# Patient Record
Sex: MALE | Race: WHITE | NOT HISPANIC OR LATINO | URBAN - METROPOLITAN AREA
[De-identification: names, ages, dates, MRNs, and addresses within clinical notes are randomized per-mention and may not be internally consistent; named-entity substitution may affect disease eponyms.]

---

## 2024-02-08 NOTE — COMMUNITY RESOURCES LIST (ENGLISH)
02/08/2024   [facility]  N/A  For questions about this resource list or additional care needs, please contact your primary care clinic or care manager.  Phone: [phone]   Email: N/A   Address: [address] [place] [id number]   Hours: N/A        Food and Nutrition       Food pantry  1  [facility] - Food [place] - Food Shelf Distance: 0.35 miles      Pick   [address] [place] [address]  Language: English, English  Hours: Wed 10:00 AM - 1:00 PM  Fees: Free   Phone: (542) 662-6927 Website: http://www.[facility]light.Quantified Skin/     2  [facility] Distance: 0.45 miles      In-Person   [address] [place] [address]  Language: English  Hours: Mon - Wed 9:30 AM - 12:00 PM  Fees: Free   Phone: (423) 208-7645 Email: info@saintolaf.org Website: http://www.saintolaf.org/     SNAP application assistance  3  [facility] Human Services and Public Health Department - Chemical Dependency Services (CDS) Distance: 0.31 miles      In-Person, Phone/Virtual   [address] [place] [address]  Language: English  Hours: Mon - Fri 9:00 AM - 9:00 PM  Fees: Free   Phone: (522) 481-9885 Website: https://[facility].[place]./[facility]/human-services/treatment-substance-use-disorders     4  [facility] - Satish [place] Distance: 0.77 miles      Phone/Virtual   [address], [place] [address]  Language: English, Salvadorean  Hours: Mon - Fri 10:00 AM - 5:00 PM  Fees: Free   Phone: (403) 876-2807 Email: arnold@[facility].org Website: https://www.[facility].org/satish-house     Soup kitchen or free meals  5  [facility] and [place] [facility] Distance: 0.24 miles      In-Person   [address] [place] [address]  Language: English, English  Hours: Mon - Sat 8:00 AM - 9:00 AM , Mon - Sat 11:30 AM - 12:30 PM  Fees: Free   Phone: (173) 852-8735 Email: info@Skyhigh Networksties.org Website:  https://www.[facility].org/locations/opportunity-center/     6  [facility] - Food [place] Public Meals Distance: 0.34 miles      [facility]   [address] [place] [address]  Language: English  Hours: Mon - Sun 8:30 AM - 9:00 AM , Mon - Sun 12:00 PM - 1:00 PM  Fees: Free   Phone: (597) 551-6698 Email: info@Yoomly Website: https://Yoomly/          Important Numbers & Websites       Emergency Services   911  [facility]   311  Poison Control   (555) 712-1682  Suicide Prevention Lifeline   (901) 978-1426 (TALK)  Child Abuse Hotline   (501) 303-3371 (4-A-Child)  Sexual Assault Hotline   (702) 547-2207 (HOPE)  National Runaway Safeline   (678) 251-7406 (RUNAWAY)  All-Options Talkline   (561) 112-9572  Substance Abuse Referral   (621) 584-6920 (HELP)

## 2024-02-15 ENCOUNTER — OFFICE VISIT (OUTPATIENT)
Dept: FAMILY MEDICINE | Facility: CLINIC | Age: 24
End: 2024-02-15
Payer: COMMERCIAL

## 2024-02-15 VITALS
TEMPERATURE: 98.3 F | WEIGHT: 206.4 LBS | OXYGEN SATURATION: 98 % | BODY MASS INDEX: 31.28 KG/M2 | HEART RATE: 81 BPM | HEIGHT: 68 IN | DIASTOLIC BLOOD PRESSURE: 86 MMHG | SYSTOLIC BLOOD PRESSURE: 135 MMHG

## 2024-02-15 DIAGNOSIS — Z11.3 ROUTINE SCREENING FOR STI (SEXUALLY TRANSMITTED INFECTION): ICD-10-CM

## 2024-02-15 DIAGNOSIS — Z77.21 CONTACT WITH AND (SUSPECTED) EXPOSURE TO POTENTIALLY HAZARDOUS BODY FLUIDS: ICD-10-CM

## 2024-02-15 DIAGNOSIS — R45.89 SYMPTOMS OF DEPRESSION: ICD-10-CM

## 2024-02-15 DIAGNOSIS — Z00.00 ENCOUNTER FOR MEDICAL EXAMINATION TO ESTABLISH CARE: Primary | ICD-10-CM

## 2024-02-15 PROBLEM — F84.0 AUTISM: Status: ACTIVE | Noted: 2024-02-15

## 2024-02-15 LAB
CREAT SERPL-MCNC: 0.88 MG/DL (ref 0.51–1.17)
EGFRCR SERPLBLD CKD-EPI 2021: >90 ML/MIN/1.73M2
HCV AB SERPL QL IA: NONREACTIVE
HIV 1+2 AB+HIV1 P24 AG SERPL QL IA: NONREACTIVE
T PALLIDUM AB SER QL: NONREACTIVE

## 2024-02-15 PROCEDURE — 36415 COLL VENOUS BLD VENIPUNCTURE: CPT

## 2024-02-15 PROCEDURE — 99214 OFFICE O/P EST MOD 30 MIN: CPT | Mod: 25

## 2024-02-15 PROCEDURE — 87389 HIV-1 AG W/HIV-1&-2 AB AG IA: CPT

## 2024-02-15 PROCEDURE — 90746 HEPB VACCINE 3 DOSE ADULT IM: CPT

## 2024-02-15 PROCEDURE — 87491 CHLMYD TRACH DNA AMP PROBE: CPT

## 2024-02-15 PROCEDURE — 86780 TREPONEMA PALLIDUM: CPT

## 2024-02-15 PROCEDURE — 87591 N.GONORRHOEAE DNA AMP PROB: CPT

## 2024-02-15 PROCEDURE — 82565 ASSAY OF CREATININE: CPT

## 2024-02-15 PROCEDURE — 86803 HEPATITIS C AB TEST: CPT

## 2024-02-15 PROCEDURE — 90471 IMMUNIZATION ADMIN: CPT

## 2024-02-15 RX ORDER — EMTRICITABINE AND TENOFOVIR DISOPROXIL FUMARATE 200; 300 MG/1; MG/1
1 TABLET, FILM COATED ORAL DAILY
COMMUNITY
Start: 2024-02-15 | End: 2024-02-16

## 2024-02-15 RX ORDER — EMTRICITABINE AND TENOFOVIR DISOPROXIL FUMARATE 200; 300 MG/1; MG/1
TABLET, FILM COATED ORAL
COMMUNITY
End: 2024-02-15 | Stop reason: ALTCHOICE

## 2024-02-15 RX ORDER — EMTRICITABINE AND TENOFOVIR DISOPROXIL FUMARATE 200; 300 MG/1; MG/1
1 TABLET, FILM COATED ORAL DAILY
Qty: 90 TABLET | Refills: 0 | Status: SHIPPED | OUTPATIENT
Start: 2024-02-15 | End: 2024-02-15

## 2024-02-15 RX ORDER — OMEGA-3 FATTY ACIDS/FISH OIL 300-1000MG
CAPSULE ORAL
COMMUNITY

## 2024-02-15 ASSESSMENT — SOCIAL DETERMINANTS OF HEALTH (SDOH)
WITHIN THE LAST YEAR, HAVE TO BEEN RAPED OR FORCED TO HAVE ANY KIND OF SEXUAL ACTIVITY BY YOUR PARTNER OR EX-PARTNER?: NO
WITHIN THE LAST YEAR, HAVE YOU BEEN HUMILIATED OR EMOTIONALLY ABUSED IN OTHER WAYS BY YOUR PARTNER OR EX-PARTNER?: NO
WITHIN THE LAST YEAR, HAVE YOU BEEN KICKED, HIT, SLAPPED, OR OTHERWISE PHYSICALLY HURT BY YOUR PARTNER OR EX-PARTNER?: NO
WITHIN THE LAST YEAR, HAVE YOU BEEN AFRAID OF YOUR PARTNER OR EX-PARTNER?: NO

## 2024-02-15 NOTE — PROGRESS NOTES
Preceptor Attestation:  Patient seen and evaluated in person. I discussed the patient with the resident. I have verified the content of the note, which accurately reflects my assessment of the patient and the plan of care.   Supervising Physician:  Sidra Galdamez DO
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
## 2024-02-15 NOTE — PROGRESS NOTES
Assessment & Plan     Constantino is a 23 year old patient who presents for the following concerns:      Encounter for medical examination to establish care  Updated history    Contact with and (suspected) exposure to potentially hazardous body fluids  Transferring care here, refilled, provided PrEP agreement and will obtain labs as not in system  - emtricitabine-tenofovir (TRUVADA) 200-300 MG per tablet  - HIV Antigen Antibody Combo Cascade  - Creatinine  - Next HIV due: 5/15/24  - Next Cr due: 8/15/24    Routine screening for STI (sexually transmitted infection)  - Hepatitis C Screen Reflex to HCV RNA Quant and Genotype  - HIV Antigen Antibody Combo Cascade  - Treponema Abs w Reflex to RPR and Titer  - Chlamydia trachomatis/Neisseria gonorrhoeae by PCR  - Chlamydia trachomatis/Neisseria gonorrhoeae by PCR  - Chlamydia trachomatis/Neisseria gonorrhoeae by PCR    Symptoms of depression  Will provide queer friendly therapist list in avs      Orders Placed This Encounter   Procedures    HEPATITIS B, ADULT 20+ (ENGERIX-B/RECOMBIVAX HB)    Hepatitis C Screen Reflex to HCV RNA Quant and Genotype    HIV Antigen Antibody Combo Cascade    Treponema Abs w Reflex to RPR and Titer    Creatinine       Post Medication Reconciliation Status: medications reconciled and changed, per note/orders    Return in about 3 months (around 5/15/2024) for Follow up, with any available provider.    More Lorenzo MD  [facility] RAFAEL    Subjective             This is a 23 year old patient, presenting for the following health concerns:    Consult (Establishing care + truvada rx)          HIV Prevention / PrEP - Follow up  Constantino is here for follow up concerning pre-exposure prophylaxis for HIV.    Ongoing risk factors for acquiring HIV infection:  Patient is member of high prevalence group (MSM, non-monogmous partnership): no/yes: no   Any anal sex without condoms in the past 6 months: no/yes: YES  Has a current partner that is HIV  "positive: no/yes: no   ANY Bacterial STI in the last 6 months: no/yes: no     Last HIV test: pos/neg: negative Date: 1 month ago, patient reported      Review of Systems   DENIES: Denies lymphadenopathy,fevers,chills,rigors,night sweats,weight loss,oral thrush,mouth lesions,dyspnea,cough,diarrhea, edema.  DENIES: Denies  discharge, rash, genital lesions.         Review of Systems   Constitutional, HEENT, cardiovascular, pulmonary, gi and gu systems are negative, except as otherwise noted.      Objective    /86   Pulse 81   Temp 98.3  F (36.8  C) (Oral)   Ht 1.727 m (5' 8\")   Wt 93.6 kg (206 lb 6.4 oz)   SpO2 98%   BMI 31.38 kg/m    Wt Readings from Last 4 Encounters:   02/15/24 93.6 kg (206 lb 6.4 oz)       Physical Exam    General: Alert and oriented, in no acute distress.  CV: No cyanosis or pallor, warm and well perfused. RRR  Respiratory: No respiratory distress, no accessory muscle use. LCTAB  Psychiatric: Mood and affect appear normal.       Results are ordered and pending    More Lorenzo MD on 2/15/2024 at 8:00 AM    ----- Service Performed and Documented by Resident or Fellow ------            .  "

## 2024-02-15 NOTE — PATIENT INSTRUCTIONS
"[place]        [facility]     Colt Sarmiento and others  [email or web address].[email or web address] Networksmn.Mascoma/team-member/ronnell/ [phone]  email: [email or web address] Networksmn.[email or web address]     [facility]   Providers: Magda Samaniego MSW, LICSW (6+) [phone]   https://www.[facility].org/    Bri Durham PsyD, [facility]    highly qualified and experienced therapist markus yrs in the field, works with youth and families, does consults for [employer]   [address] [place] [address], [address] [facility] [phone]    Family Partnership   all ages   [place]   \"many providers, see link [phone], Hmoob: [phone]  https://www.[facility].org/ \"     [facility]  [place]   [email or web address], [phone]     [facility]    Laura Todd MSW, LICSW https://[facility]Demon/    [phone]    Adeola Santizo    [place]    [email or web address]/ couples therapy too     [facility]   [place]  elie Francis they/them, others  http://www.[facility]/therapists.html   Devaughn Kimbrough MA, LMFT (16+), Geeta AVINA (10+); Jackelyn Calderón MS LAMFT; Margot Chaudhary MS; Taylor Rachel MS, LAMFT; & Others    [facility]   [place]   Carlos Zelaya Risa Roth and Nacho Quintanilla all see adolescents and adults https://[facility].com/skzd-qvmrtfgiz-uhzz/     New quejavid/trans affirming group for folks newly exploring polyamory - call [phone] ext [phone]    [facility]     [facility] [address] [id number]   [email or web address]/services     Melanie Whittaker PhD     [place]    [facility], [facility]     Rafita Barrera they/them   private practice, transfeminine support group.     Odilon Yeboah she/her  private practice  [address]   [phone] " "  arttherapymn.[employer]/phi/                [facility]   [phone] https://Ampex/services/childrens-mental-health-services/    Jaimee Temple [facility], Nikky Herrera MS, LPC, CEOLD; Nilo Johnson MS, LPCC(older adolescents); & others    [facility]   ages 12-26    sliding scale, have weekly parent education groups    [facility].care, [phone]    Barbara Hoyos    gender therapy and family therapy, good support for partner     [facility]    carina, saji tang, [place]   Anshu Gomes, also does testing for ADHD, other psychiatric issues,   Cali Benton. They also have a prgoram for DBT   https://[email or web address].com/counseling-for-lgbtqia-concerns/     Susan Hameed MA; Roopa Moran MS, [facility], others   Amber Helms  private prac http://www.[facility] [place]/ [email or web address]@[email or web address].net     Scar Thurston  he/him   [address] [facility] [phone]   [email or web address]/ [email or web address]    Delia Yeager   Private practice, does TeleHealth, experience with trans vets, but also civilian trans folks.   [email or web address]    Mandi Louie she/they  gender creative children, adolescents, adults, [facility], housed within the dept of Urology      Franklin Johansen he/him they/them  [facility], housed in urology        [facility]  [phone], [email or web address]     The family development center  https://www.[facility].com/our-therapists     PrEP Agreement    It has been explained to me that:     Taking a dose of PrEP medication every day may lower my risk of getting HIV infection.  If I am unable to take daily medications or this is not the best option for me, we have discussed \"PrEP On Demand Dosing\" (AKA PrEP 2-1-1)  This medicine does not completely eliminate my risk of getting HIV infection, so I should use condoms during sex for additional protection.   This medicine may cause side " "effects so I should contact my provider for advice by calling [phone] if I have any health problems.    It is important for my health to find out quickly if I get HIV infection while I'm taking this medication, so I will contact my provider right away if I have symptoms of possible HIV infection (symptoms like fever with sore throat, rash, headache, or swollen glands).  My provider will test for HIV infection at least once every 3 months.   PrEP cannot be refilled without routine testing.      Therefore, I will:     Try my best to take the medication as prescribed/ discussed with my provider  Talk to my provider about any problems I have in taking the medication  Not share the medication with any other person  Attend all my scheduled appointments/ complete all routine labs  Call [phone] to reschedule any appointments I cannot attend.       What Is OMT (Osteopathic Manipulative Treatment)?    As part of their education, DOs (doctor of osteopathic medicine) receive special training in the musculoskeletal system (your nerves, bones and muscles).     OMT involves using the hands to diagnose, treat and prevent illness or injury.  Using OMT, your osteopathic physician will move your muscles and joints using techniques including stretching, gentle pressure and resistance.     OMT can help people of all ages and backgrounds. In addition to muscle or joint pain, it can be used to treat a variety of conditions such as asthma, sinus pain, migraines and carpal tunnel syndrome.     For more information, please visit [email or web address].org    How to Schedule OMT :  Please make an appointment for \"OMT\" with the .  Please inform them you are scheduling for OMT with any DO provider. A list is provided below:     Melly Muñoz, DO  Jefferson Garcia, DO  America Hodges, DO  Sidra Galdamez, DO  Nargis Montano, DO  Ian Rubio, DO  Bong Patel, DO  Rylee Connors, DO  Bharati Feliz, DO    Patient Education "   Here is the plan from today's visit    1. Encounter for medical examination to establish care    2. Contact with and (suspected) exposure to potentially hazardous body fluids  - Creatinine; Future  - emtricitabine-tenofovir (TRUVADA) 200-300 MG per tablet; Take 1 tablet by mouth daily    3. Routine screening for STI (sexually transmitted infection)  - Hepatitis C Screen Reflex to HCV RNA Quant and Genotype; Future  - HIV Antigen Antibody Combo Cascade; Future  - Treponema Abs w Reflex to RPR and Titer; Future  - Chlamydia trachomatis/Neisseria gonorrhoeae by PCR  - Chlamydia trachomatis/Neisseria gonorrhoeae by PCR  - Chlamydia trachomatis/Neisseria gonorrhoeae by PCR; Future  - Hepatitis C Screen Reflex to HCV RNA Quant and Genotype  - HIV Antigen Antibody Combo Cascade  - Treponema Abs w Reflex to RPR and Titer  - Chlamydia trachomatis/Neisseria gonorrhoeae by PCR    4. Symptoms of depression        Please call or return to clinic if your symptoms don't go away.    Follow up plan  Return in about 3 months (around 5/15/2024) for Follow up, with any available provider.    Thank you for coming to [facility] today.  Lab Testing:  **If you had lab testing today and your results are reassuring or normal they will be mailed to you or sent through GoYoDeo within 7 days.   **If the lab tests need quick action we will call you with the results.  **If you are having labs done on a different day, please call [phone] to schedule at [facility] or [phone] for other [facility] locations. Labs do not offer walk-in appointments.  The phone number we will call with results is # [phone] (home) . If this is not the best number please call our clinic and change the number.  Medication Refills:  If you need any refills please call your pharmacy and they will contact us.   If you need to  your refill at a new pharmacy, please contact the new pharmacy directly. The new pharmacy will help  you get your medications transferred faster.   Scheduling:  If you have any concerns about today's visit or wish to schedule another appointment please call our office during normal business hours [phone] (8-5:00 M-F). If you can no longer make a scheduled visit, please cancel via [employer] or call us to cancel.   If a referral was made to an [facility] specialty provider and you do not get a call from central scheduling, please refer to directions on your visit summary or call our office during normal business hours for assistance.   If a Mammogram was ordered for you at the Breast Center call [phone] to schedule or change your appointment.  If you had an XRay/CT/Ultrasound/MRI ordered the number is [phone] to schedule or change your radiology appointment.   [facility] has limited ultrasound appointments available on Wednesdays, if you would like your ultrasound at [facility], please call [phone] to schedule.   Medical Concerns:  If you have urgent medical concerns please call [phone] at any time of the day.    More Lorenzo MD

## 2024-02-16 ENCOUNTER — TELEPHONE (OUTPATIENT)
Dept: FAMILY MEDICINE | Facility: CLINIC | Age: 24
End: 2024-02-16
Payer: COMMERCIAL

## 2024-02-16 LAB
C TRACH DNA SPEC QL PROBE+SIG AMP: NEGATIVE
N GONORRHOEA DNA SPEC QL NAA+PROBE: NEGATIVE

## 2024-02-16 RX ORDER — EMTRICITABINE AND TENOFOVIR DISOPROXIL FUMARATE 200; 300 MG/1; MG/1
1 TABLET, FILM COATED ORAL DAILY
Qty: 90 TABLET | Refills: 0 | Status: SHIPPED | OUTPATIENT
Start: 2024-02-16 | End: 2024-05-10

## 2024-02-16 NOTE — TELEPHONE ENCOUNTER
Spoke with preceptor, Dr. Muñoz, who thought that provider accidentally put the script in as historical, she changed it and I notified the pharmacy that script was ready to go.    Evelina Foster RN

## 2024-02-20 ENCOUNTER — OFFICE VISIT (OUTPATIENT)
Dept: FAMILY MEDICINE | Facility: CLINIC | Age: 24
End: 2024-02-20
Payer: COMMERCIAL

## 2024-02-20 VITALS
DIASTOLIC BLOOD PRESSURE: 97 MMHG | HEIGHT: 68 IN | OXYGEN SATURATION: 98 % | RESPIRATION RATE: 16 BRPM | HEART RATE: 78 BPM | SYSTOLIC BLOOD PRESSURE: 151 MMHG | WEIGHT: 206.5 LBS | BODY MASS INDEX: 31.3 KG/M2 | TEMPERATURE: 97.5 F

## 2024-02-20 DIAGNOSIS — M99.00 SEGMENTAL AND SOMATIC DYSFUNCTION OF HEAD REGION: ICD-10-CM

## 2024-02-20 DIAGNOSIS — M62.838 MUSCLE SPASM: Primary | ICD-10-CM

## 2024-02-20 DIAGNOSIS — R03.0 ELEVATED BLOOD PRESSURE READING WITHOUT DIAGNOSIS OF HYPERTENSION: ICD-10-CM

## 2024-02-20 DIAGNOSIS — M99.01 SEGMENTAL AND SOMATIC DYSFUNCTION OF CERVICAL REGION: ICD-10-CM

## 2024-02-20 PROCEDURE — 99213 OFFICE O/P EST LOW 20 MIN: CPT | Mod: 25

## 2024-02-20 PROCEDURE — 98925 OSTEOPATH MANJ 1-2 REGIONS: CPT | Mod: GC

## 2024-02-20 RX ORDER — CYCLOBENZAPRINE HCL 5 MG
5 TABLET ORAL 3 TIMES DAILY PRN
Qty: 30 TABLET | Refills: 0 | Status: SHIPPED | OUTPATIENT
Start: 2024-02-20 | End: 2024-03-13

## 2024-02-20 NOTE — PROGRESS NOTES
Preceptor Attestation:   Patient seen, evaluated and discussed with the resident. I have verified the content of the note, which accurately reflects my assessment of the patient and the plan of care.   Supervising Physician:  Avinash Pugh MD

## 2024-02-20 NOTE — PROGRESS NOTES
"HPI      Constantino is a 23 year old who presents today for Osteopathic Evaluation.   Chief Complaint   Patient presents with    Clinic Care Coordination - Initial     Pt here for omt       - Jaw pain worse over the past year, R>L that radiates down their neck  - Pretty sure that it started when they were 10 years old   - Wearing  to help with grinding   - Takes 600-800 mg ibuprofen every night    All active medical problems, med list, PMHx, and social Hx updated and reviewed.    No Known Allergies  Review of Systems       ROS      10 point ROS neg other than the symptoms noted above here or in the HPI.    Physical Exam     Vitals:    02/20/24 0925 02/20/24 0927   BP: (!) 149/84 (!) 151/97   BP Location: Left arm Left arm   Patient Position: Sitting Sitting   Cuff Size: Adult Large Adult Large   Pulse: 78    Resp: 16    Temp: 97.5  F (36.4  C)    TempSrc: Oral    SpO2: 98%    Weight: 93.7 kg (206 lb 8 oz)    Height: 1.727 m (5' 8\")      Vitals were reviewed    X-Ray: not indicated.  No results found for any visits on 02/20/24.    Physical Exam  General: Alert and oriented, in no acute distress.  Skin: Warm and dry, no abnormalities noted.  Eyes: Extra-ocular muscles intact, pupils equal and reactive.  ENT: Speech intact, nasal passages open, no hearing impairment noted.  Neck: Supple, no swelling noted.  CV: No cyanosis or pallor, warm and well perfused.  Respiratory: No respiratory distress, no accessory muscle use.  Neuro: Gait and station normal, comprehension intact. Gross and fine motor skills intact.   Psychiatric: Mood and affect appear normal.   Extremities: Warm, able to move all four extremities at will.    Osteopathic Structural Exam and additional MSK exam found below in OMT Procedure Note.  Assessment and Plan     (M62.838) Muscle spasm  (primary encounter diagnosis)  (M99.00) Segmental and somatic dysfunction of head region  (M99.01) Segmental and somatic dysfunction of cervical region  Comment: " Given that this has been interfering with the patient's quality of life and ADLs, after discussion, informed consent, and medical assessment for safety, we have together decided to address this concern with Osteopathic Manipulative Treatment. Please see OMT Procedure Note below for the specifics of treatment. Prescribed Flexeril to assist with TMJ release.  Plan: cyclobenzaprine (FLEXERIL) 5 MG tablet,         OSTEOPATHIC MANIP,1-2 BODY REGN          (R03.0) Elevated blood pressure reading without diagnosis of hypertension  Comment: Elevated blood pressures are likely secondary to white coat HTN, but Constantino reports that they have a history of elevated blood pressures in childhood. Ordered a BP cuff and instructed them to measure their BP at home and follow up with their PCP for management.  Plan: Home Blood Pressure Monitor Order for DME -         ONLY FOR DME    Rylee Connors DO, MPH     OMT PROCEDURE NOTE    Body Region: Head, Face, and/or Jaw  Somatic Dysfunction: Tender point over right TMJ, hypertonic bilateral suboccipital fossa, measurement of oral height is 60 cm with jaw fully open  Treatment: Direct Myofascial Release, Counterstrain, and Soft Tissue Techniques.  Outcome: Resolved    Body Region: C-spine / Neck  Somatic Dysfunction: Bilateral hypertonic paracervical muscles  Treatment: Direct Muscle Energy and Soft Tissue Techniques.  Outcome: Improved    The patient actively participated in OMT and was able to communicate both positive and negative feedback throughout. OMT completed without incident. Patient tolerated treatment well. Patient reported that ROM, function, and/or pain level were improved. Advised that pain is occasionally worse during the first 24 hours after treatment and that drinking more water and taking Tylenol or Ibuprofen often help. Patient to return in 2 week/s or as needed for repeat osteopathic assessment.     Rylee Connors DO, MPH

## 2024-02-28 ENCOUNTER — OFFICE VISIT (OUTPATIENT)
Dept: FAMILY MEDICINE | Facility: CLINIC | Age: 24
End: 2024-02-28
Payer: COMMERCIAL

## 2024-02-28 VITALS
SYSTOLIC BLOOD PRESSURE: 145 MMHG | HEIGHT: 68 IN | BODY MASS INDEX: 31.4 KG/M2 | HEART RATE: 139 BPM | DIASTOLIC BLOOD PRESSURE: 83 MMHG | TEMPERATURE: 98.3 F | OXYGEN SATURATION: 98 %

## 2024-02-28 DIAGNOSIS — Z13.220 SCREENING CHOLESTEROL LEVEL: ICD-10-CM

## 2024-02-28 DIAGNOSIS — Z13.1 SCREENING FOR DIABETES MELLITUS: ICD-10-CM

## 2024-02-28 DIAGNOSIS — I10 HYPERTENSION, UNSPECIFIED TYPE: Primary | ICD-10-CM

## 2024-02-28 LAB
ANION GAP SERPL CALCULATED.3IONS-SCNC: 11 MMOL/L (ref 7–15)
BUN SERPL-MCNC: 11.8 MG/DL (ref 6–20)
CALCIUM SERPL-MCNC: 10.3 MG/DL (ref 8.6–10)
CHLORIDE SERPL-SCNC: 101 MMOL/L (ref 98–107)
CHOLEST SERPL-MCNC: 160 MG/DL
CREAT SERPL-MCNC: 0.95 MG/DL (ref 0.51–1.17)
CREAT UR-MCNC: 83.4 MG/DL
DEPRECATED HCO3 PLAS-SCNC: 27 MMOL/L (ref 22–29)
EGFRCR SERPLBLD CKD-EPI 2021: >90 ML/MIN/1.73M2
FASTING STATUS PATIENT QL REPORTED: ABNORMAL
GLUCOSE SERPL-MCNC: 100 MG/DL (ref 70–99)
HBA1C MFR BLD: 5 % (ref 0–5.6)
HDLC SERPL-MCNC: 37 MG/DL
LDLC SERPL CALC-MCNC: 106 MG/DL
MICROALBUMIN UR-MCNC: <12 MG/L
MICROALBUMIN/CREAT UR: NORMAL MG/G{CREAT}
NONHDLC SERPL-MCNC: 123 MG/DL
POTASSIUM SERPL-SCNC: 4.6 MMOL/L (ref 3.4–5.3)
SODIUM SERPL-SCNC: 139 MMOL/L (ref 135–145)
TRIGL SERPL-MCNC: 83 MG/DL
TSH SERPL DL<=0.005 MIU/L-ACNC: 1.1 UIU/ML (ref 0.3–4.2)

## 2024-02-28 PROCEDURE — 82570 ASSAY OF URINE CREATININE: CPT

## 2024-02-28 PROCEDURE — 84443 ASSAY THYROID STIM HORMONE: CPT

## 2024-02-28 PROCEDURE — 82088 ASSAY OF ALDOSTERONE: CPT

## 2024-02-28 PROCEDURE — 82043 UR ALBUMIN QUANTITATIVE: CPT

## 2024-02-28 PROCEDURE — 99000 SPECIMEN HANDLING OFFICE-LAB: CPT

## 2024-02-28 PROCEDURE — 80061 LIPID PANEL: CPT

## 2024-02-28 PROCEDURE — 84244 ASSAY OF RENIN: CPT | Mod: 90

## 2024-02-28 PROCEDURE — 36415 COLL VENOUS BLD VENIPUNCTURE: CPT

## 2024-02-28 PROCEDURE — 99214 OFFICE O/P EST MOD 30 MIN: CPT | Mod: GC

## 2024-02-28 PROCEDURE — 83036 HEMOGLOBIN GLYCOSYLATED A1C: CPT

## 2024-02-28 PROCEDURE — 80048 BASIC METABOLIC PNL TOTAL CA: CPT
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
## 2024-02-28 NOTE — PATIENT INSTRUCTIONS
Patient Education   Here is the plan from today's visit    1. Hypertension, unspecified type  - Basic metabolic panel; Future  - Albumin Random Urine Quantitative with Creat Ratio; Future  - TSH with free T4 reflex; Future  - Aldosterone Renin Ratio; Future      2. Screening cholesterol level  - Lipid panel reflex to direct LDL Non-fasting    3. Screening for diabetes mellitus  - Hemoglobin A1c          Please call or return to clinic if your symptoms don't go away.    Follow up plan  No follow-ups on file.    Thank you for coming to [facility] today.  Lab Testing:  **If you had lab testing today and your results are reassuring or normal they will be mailed to you or sent through [employer] within 7 days.   **If the lab tests need quick action we will call you with the results.  **If you are having labs done on a different day, please call [phone] to schedule at [facility] or [phone] for other [facility] Outpatient Lab locations. Labs do not offer walk-in appointments.  The phone number we will call with results is # [phone] (home) . If this is not the best number please call our clinic and change the number.  Medication Refills:  If you need any refills please call your pharmacy and they will contact us.   If you need to  your refill at a new pharmacy, please contact the new pharmacy directly. The new pharmacy will help you get your medications transferred faster.   Scheduling:  If you have any concerns about today's visit or wish to schedule another appointment please call our office during normal business hours [phone] (8-5:00 M-F). If you can no longer make a scheduled visit, please cancel via [employer] or call us to cancel.   If a referral was made to an [facility] specialty provider and you do not get a call from central scheduling, please refer to directions on your visit summary or call our office during normal business hours for assistance.   If a Mammogram was  ordered for you at the Breast Center call [phone] to schedule or change your appointment.  If you had an XRay/CT/Ultrasound/MRI ordered the number is [phone] to schedule or change your radiology appointment.   [facility] has limited ultrasound appointments available on Wednesdays, if you would like your ultrasound at [facility], please call [phone] to schedule.   Medical Concerns:  If you have urgent medical concerns please call [phone] at any time of the day.    More Lorenzo MD

## 2024-02-29 NOTE — PATIENT INSTRUCTIONS
Patient Education   Here is the plan from today's visit    1. Muscle spasm  - cyclobenzaprine (FLEXERIL) 5 MG tablet; Take 1 tablet (5 mg) by mouth 3 times daily as needed for muscle spasms  Dispense: 30 tablet; Refill: 0  - OSTEOPATHIC MANIP,1-2 BODY REGN    2. Segmental and somatic dysfunction of head region  - OSTEOPATHIC MANIP,1-2 BODY REGN    3. Segmental and somatic dysfunction of cervical region  - OSTEOPATHIC MANIP,1-2 BODY REGN    4. Elevated blood pressure reading without diagnosis of hypertension  - Home Blood Pressure Monitor Order for DME - ONLY FOR DME    Please call or return to clinic if your symptoms don't go away.    Follow up plan  Return if symptoms worsen or fail to improve.    Thank you for coming to [facility] today.  Lab Testing:  **If you had lab testing today and your results are reassuring or normal they will be mailed to you or sent through [employer] within 7 days.   **If the lab tests need quick action we will call you with the results.  **If you are having labs done on a different day, please call [phone] to schedule at [facility] or [phone] for other [facility] Outpatient Lab locations. Labs do not offer walk-in appointments.  The phone number we will call with results is # [phone] (home) . If this is not the best number please call our clinic and change the number.  Medication Refills:  If you need any refills please call your pharmacy and they will contact us.   If you need to  your refill at a new pharmacy, please contact the new pharmacy directly. The new pharmacy will help you get your medications transferred faster.   Scheduling:  If you have any concerns about today's visit or wish to schedule another appointment please call our office during normal business hours [phone] (8-5:00 M-F). If you can no longer make a scheduled visit, please cancel via [employer] or call us to cancel.   If a referral was made to an [facility] specialty  provider and you do not get a call from central scheduling, please refer to directions on your visit summary or call our office during normal business hours for assistance.   If a Mammogram was ordered for you at the Breast Center call [phone] to schedule or change your appointment.  If you had an XRay/CT/Ultrasound/MRI ordered the number is [phone] to schedule or change your radiology appointment.   [facility] has limited ultrasound appointments available on Wednesdays, if you would like your ultrasound at [facility], please call [phone] to schedule.   Medical Concerns:  If you have urgent medical concerns please call [phone] at any time of the day.    Rylee Connors, DO

## 2024-03-01 LAB — ALDOST SERPL-MCNC: 7.4 NG/DL (ref 0–31)

## 2024-03-02 LAB — RENIN PLAS-CCNC: 5.5 NG/ML/HR

## 2024-03-04 LAB — ALDOST/RENIN PLAS-RTO: 1.3 {RATIO} (ref 0–25)

## 2024-03-10 ENCOUNTER — HEALTH MAINTENANCE LETTER (OUTPATIENT)
Age: 24
End: 2024-03-10

## 2024-03-13 ENCOUNTER — OFFICE VISIT (OUTPATIENT)
Dept: FAMILY MEDICINE | Facility: CLINIC | Age: 24
End: 2024-03-13
Payer: COMMERCIAL

## 2024-03-13 DIAGNOSIS — Z23 NEED FOR HEPATITIS B VACCINATION: ICD-10-CM

## 2024-03-13 DIAGNOSIS — M26.609 TMJ (TEMPOROMANDIBULAR JOINT SYNDROME): Primary | ICD-10-CM

## 2024-03-13 DIAGNOSIS — M99.01 SEGMENTAL AND SOMATIC DYSFUNCTION OF CERVICAL REGION: ICD-10-CM

## 2024-03-13 DIAGNOSIS — M99.07 SEGMENTAL AND SOMATIC DYSFUNCTION OF UPPER EXTREMITY: ICD-10-CM

## 2024-03-13 DIAGNOSIS — M62.838 MUSCLE SPASM: ICD-10-CM

## 2024-03-13 DIAGNOSIS — M99.00 SEGMENTAL AND SOMATIC DYSFUNCTION OF HEAD REGION: ICD-10-CM

## 2024-03-13 PROCEDURE — 90746 HEPB VACCINE 3 DOSE ADULT IM: CPT

## 2024-03-13 PROCEDURE — 99213 OFFICE O/P EST LOW 20 MIN: CPT | Mod: 25

## 2024-03-13 PROCEDURE — 98926 OSTEOPATH MANJ 3-4 REGIONS: CPT | Mod: GC

## 2024-03-13 PROCEDURE — 90471 IMMUNIZATION ADMIN: CPT

## 2024-03-13 RX ORDER — CYCLOBENZAPRINE HCL 5 MG
5 TABLET ORAL 3 TIMES DAILY PRN
Qty: 30 TABLET | Refills: 0 | Status: SHIPPED | OUTPATIENT
Start: 2024-03-13 | End: 2024-04-12

## 2024-03-13 NOTE — PROGRESS NOTES
HPI      Constantino is a 23 year old who presents today for Osteopathic Evaluation. No chief complaint on file.    Jaw still tight. Their jaw pain became a little worse after sinus infection, but they couldn't tell if it was the infection or the exercises we did together during out last session.      All active medical problems, med list, PMHx, and social Hx updated and reviewed.    No Known Allergies  Review of Systems       ROS      10 point ROS neg other than the symptoms noted above here or in the HPI.    Physical Exam   There were no vitals filed for this visit.    X-Ray: not indicated.  No results found for any visits on 03/13/24.    Physical Exam  General: Alert and oriented, in no acute distress.  Skin: Warm and dry, no abnormalities noted.  Eyes: Extra-ocular muscles intact, pupils equal and reactive.  ENT: Speech intact, nasal passages open, no hearing impairment noted.  Neck: Supple, no swelling noted.  CV: No cyanosis or pallor, warm and well perfused.  Respiratory: No respiratory distress, no accessory muscle use.  Neuro: Gait and station normal, comprehension intact. Gross and fine motor skills intact.   Psychiatric: Mood and affect appear normal.   Extremities: Warm, able to move all four extremities at will.    Osteopathic Structural Exam and additional MSK exam found below in OMT Procedure Note.  Assessment and Plan     (M26.609) TMJ (temporomandibular joint syndrome)  (primary encounter diagnosis)  (M99.00) Segmental and somatic dysfunction of head region  (M99.07) Segmental and somatic dysfunction of upper extremity  (M99.01) Segmental and somatic dysfunction of cervical region  Comment: Given that this has been interfering with the patient's quality of life and ADLs, after discussion, informed consent, and medical assessment for safety, we have together decided to address this concern with Osteopathic Manipulative Treatment. Please see OMT Procedure Note below for the specifics of treatment.  Plan:  Physical Therapy  Referral,         OSTEOPATHIC MANIP,3-4 BODY REGN, Other         Specialty Referral    (M62.838) Muscle spasm  Comment: Refill requested: refilled.  Plan: cyclobenzaprine (FLEXERIL) 5 MG tablet         (Z23) Need for hepatitis B vaccination  Plan: HEPATITIS B, ADULT 20+ (ENGERIX-B/RECOMBIVAX         HB)          Rylee Connors DO, MPH     OMT PROCEDURE NOTE    Body Region: Head, Face, and/or Jaw  Somatic Dysfunction: Tender point over the right suprahyoid muscle  Treatment: Counterstrain Techniques.  Outcome: Improved    Body Region: C-spine / Neck  Somatic Dysfunction: Hypertonic suboccipital fossa bilaterally  Treatment: Direct Myofascial Release and Soft Tissue Techniques.  Outcome: Improved    Body Region: Shoulder, UE and/or Hand  Somatic Dysfunction: Tender point over right trapezius   Treatment: Direct Myofascial Release, Counterstrain, and Soft Tissue Techniques.  Outcome: Resolved    The patient actively participated in OMT and was able to communicate both positive and negative feedback throughout. OMT completed without incident. Patient tolerated treatment well. Patient reported that ROM, function, and/or pain level were improved. Advised that pain is occasionally worse during the first 24 hours after treatment and that drinking more water and taking Tylenol or Ibuprofen often help. Patient to return in 2 week/s or as needed for repeat osteopathic assessment.     Rylee Connors DO, MPH

## 2024-03-17 NOTE — PATIENT INSTRUCTIONS
Patient Education   Here is the plan from today's visit    1. TMJ (temporomandibular joint syndrome)  Keep doing the stretches/exercises we discussed during our session  - Physical Therapy  Referral; Future  - OSTEOPATHIC MANIP,3-4 BODY REGN  - Other Specialty Referral; Future    2. Segmental and somatic dysfunction of head region  - OSTEOPATHIC MANIP,3-4 BODY REGN    3. Segmental and somatic dysfunction of upper extremity  - OSTEOPATHIC MANIP,3-4 BODY REGN    4. Segmental and somatic dysfunction of cervical region  - OSTEOPATHIC MANIP,3-4 BODY REGN    5. Muscle spasm  - cyclobenzaprine (FLEXERIL) 5 MG tablet; Take 1 tablet (5 mg) by mouth 3 times daily as needed for muscle spasms  Dispense: 30 tablet; Refill: 0    6. Need for hepatitis B vaccination  - HEPATITIS B, ADULT 20+ (ENGERIX-B/RECOMBIVAX HB)    Please call or return to clinic if your symptoms don't go away.    Follow up plan  Return if symptoms worsen or fail to improve.    Thank you for coming to [facility] today.  Lab Testing:  **If you had lab testing today and your results are reassuring or normal they will be mailed to you or sent through [employer] within 7 days.   **If the lab tests need quick action we will call you with the results.  **If you are having labs done on a different day, please call [phone] to schedule at [facility] or [phone] for other [facility] Outpatient Lab locations. Labs do not offer walk-in appointments.  The phone number we will call with results is # [phone] (home) . If this is not the best number please call our clinic and change the number.  Medication Refills:  If you need any refills please call your pharmacy and they will contact us.   If you need to  your refill at a new pharmacy, please contact the new pharmacy directly. The new pharmacy will help you get your medications transferred faster.   Scheduling:  If you have any concerns about today's visit or wish to schedule another  appointment please call our office during normal business hours [phone] (8-5:00 M-F). If you can no longer make a scheduled visit, please cancel via [employer] or call us to cancel.   If a referral was made to an [facility] specialty provider and you do not get a call from central scheduling, please refer to directions on your visit summary or call our office during normal business hours for assistance.   If a Mammogram was ordered for you at the Breast Center call [phone] to schedule or change your appointment.  If you had an XRay/CT/Ultrasound/MRI ordered the number is [phone] to schedule or change your radiology appointment.   [facility] has limited ultrasound appointments available on Wednesdays, if you would like your ultrasound at [facility], please call [phone] to schedule.   Medical Concerns:  If you have urgent medical concerns please call [phone] at any time of the day.    DO CAROLEE Garnica successfully faxed specialty referral to [facility] [place] (F:  [phone])  Janette Bey

## 2024-03-18 ENCOUNTER — TELEPHONE (OUTPATIENT)
Dept: FAMILY MEDICINE | Facility: CLINIC | Age: 24
End: 2024-03-18
Payer: COMMERCIAL

## 2024-03-18 NOTE — TELEPHONE ENCOUNTER
Referral successfully faxed to [facility] (F: [phone]).    Reynold North  Care Coordinator- [facility]  [phone]

## 2024-03-29 ENCOUNTER — OFFICE VISIT (OUTPATIENT)
Dept: FAMILY MEDICINE | Facility: CLINIC | Age: 24
End: 2024-03-29
Payer: COMMERCIAL

## 2024-03-29 DIAGNOSIS — M99.07 SEGMENTAL AND SOMATIC DYSFUNCTION OF UPPER EXTREMITY: ICD-10-CM

## 2024-03-29 DIAGNOSIS — M99.02 SEGMENTAL AND SOMATIC DYSFUNCTION OF THORACIC REGION: ICD-10-CM

## 2024-03-29 DIAGNOSIS — M26.609 TMJ (TEMPOROMANDIBULAR JOINT SYNDROME): Primary | ICD-10-CM

## 2024-03-29 DIAGNOSIS — M99.00 SEGMENTAL AND SOMATIC DYSFUNCTION OF HEAD REGION: ICD-10-CM

## 2024-03-29 PROCEDURE — 98926 OSTEOPATH MANJ 3-4 REGIONS: CPT | Mod: GC

## 2024-03-29 NOTE — PATIENT INSTRUCTIONS
Patient Education   Here is the plan from today's visit    1. TMJ (temporomandibular joint syndrome)  - OSTEOPATHIC MANIP,3-4 BODY REGN    2. Segmental and somatic dysfunction of head region  - OSTEOPATHIC MANIP,3-4 BODY REGN    3. Segmental and somatic dysfunction of thoracic region  - OSTEOPATHIC MANIP,3-4 BODY REGN    4. Segmental and somatic dysfunction of upper extremity  - OSTEOPATHIC MANIP,3-4 BODY REGN    Please call or return to clinic if your symptoms don't go away.    Follow up plan  Return if symptoms worsen or fail to improve.    Thank you for coming to [facility] today.  Lab Testing:  **If you had lab testing today and your results are reassuring or normal they will be mailed to you or sent through [employer] within 7 days.   **If the lab tests need quick action we will call you with the results.  **If you are having labs done on a different day, please call [phone] to schedule at [facility] or [phone] for other [facility] Outpatient Lab locations. Labs do not offer walk-in appointments.  The phone number we will call with results is # [phone] (home) . If this is not the best number please call our clinic and change the number.  Medication Refills:  If you need any refills please call your pharmacy and they will contact us.   If you need to  your refill at a new pharmacy, please contact the new pharmacy directly. The new pharmacy will help you get your medications transferred faster.   Scheduling:  If you have any concerns about today's visit or wish to schedule another appointment please call our office during normal business hours [phone] (8-5:00 M-F). If you can no longer make a scheduled visit, please cancel via [employer] or call us to cancel.   If a referral was made to an [facility] specialty provider and you do not get a call from central scheduling, please refer to directions on your visit summary or call our office during normal business hours for  assistance.   If a Mammogram was ordered for you at the Breast Center call [phone] to schedule or change your appointment.  If you had an XRay/CT/Ultrasound/MRI ordered the number is [phone] to schedule or change your radiology appointment.   [facility] has limited ultrasound appointments available on Wednesdays, if you would like your ultrasound at [facility], please call [phone] to schedule.   Medical Concerns:  If you have urgent medical concerns please call [phone] at any time of the day.    Rylee Connors, DO

## 2024-03-29 NOTE — PROGRESS NOTES
HPI      Constantino is a 23 year old who presents today for Osteopathic Evaluation.   Chief Complaint   Patient presents with    Clinic Care Coordination - Initial     Pt here for omt       - Right side is more loose   - Less neck stiffness in the morning  - Jaw is clicking more frequently as we release their muscles  - Pain has decreased with exercises   - Has right-sided thoracic back pain    All active medical problems, med list, PMHx, and social Hx updated and reviewed.    No Known Allergies  Review of Systems       ROS      10 point ROS neg other than the symptoms noted above here or in the HPI.    Physical Exam   There were no vitals filed for this visit.    X-Ray: not indicated.  No results found for any visits on 03/29/24.    Physical Exam  General: Alert and oriented, in no acute distress.  Skin: Warm and dry, no abnormalities noted.  Eyes: Extra-ocular muscles intact, pupils equal and reactive.  ENT: Speech intact, nasal passages open, no hearing impairment noted.  Neck: Supple, no swelling noted.  CV: No cyanosis or pallor, warm and well perfused.  Respiratory: No respiratory distress, no accessory muscle use.  Neuro: Gait and station normal, comprehension intact. Gross and fine motor skills intact.   Psychiatric: Mood and affect appear normal.   Extremities: Warm, able to move all four extremities at will.    Osteopathic Structural Exam and additional MSK exam found below in OMT Procedure Note.  Assessment and Plan     (M26.609) TMJ (temporomandibular joint syndrome)  (primary encounter diagnosis)  (M99.00) Segmental and somatic dysfunction of head region  (M99.02) Segmental and somatic dysfunction of thoracic region  (M99.07) Segmental and somatic dysfunction of upper extremity  Comment: Given that this has been interfering with the patient's quality of life and ADLs, after discussion, informed consent, and medical assessment for safety, we have together decided to address this concern with Osteopathic  Manipulative Treatment. Please see OMT Procedure Note below for the specifics of treatment.  Plan: OSTEOPATHIC MANIP,3-4 BODY REGN         Rylee Connors DO, MPH     OMT PROCEDURE NOTE    Body Region: Head, Face, and/or Jaw  Somatic Dysfunction: Hypertonic and tender right stylohyoid muscle  Treatment: Counterstrain Techniques.  Outcome: Improved    Body Region: T-spine and Ribs  Somatic Dysfunction: Tender point later to right T4  Treatment: Indirect Myofascial Release, Soft Tissue, and Direct Inhibitory Techniques.   Outcome: Improved    Body Region: Shoulder, UE and/or Hand  Somatic Dysfunction: Tender point on attachment site of left trapezius   Treatment: Counterstrain and Soft Tissue Techniques.  Outcome: Resolved    The patient actively participated in OMT and was able to communicate both positive and negative feedback throughout. OMT completed without incident. Patient tolerated treatment well. Patient reported that ROM, function, and/or pain level were improved. Advised that pain is occasionally worse during the first 24 hours after treatment and that drinking more water and taking Tylenol or Ibuprofen often help. Patient to return in 2 week/s or as needed for repeat osteopathic assessment.     Rylee Connors DO, MPH

## 2024-04-12 ENCOUNTER — OFFICE VISIT (OUTPATIENT)
Dept: FAMILY MEDICINE | Facility: CLINIC | Age: 24
End: 2024-04-12
Payer: COMMERCIAL

## 2024-04-12 DIAGNOSIS — M62.838 MUSCLE SPASM: ICD-10-CM

## 2024-04-12 DIAGNOSIS — M99.02 SEGMENTAL AND SOMATIC DYSFUNCTION OF THORACIC REGION: ICD-10-CM

## 2024-04-12 DIAGNOSIS — M26.609 TMJ (TEMPOROMANDIBULAR JOINT SYNDROME): Primary | ICD-10-CM

## 2024-04-12 DIAGNOSIS — M99.01 SEGMENTAL AND SOMATIC DYSFUNCTION OF CERVICAL REGION: ICD-10-CM

## 2024-04-12 DIAGNOSIS — M99.00 SEGMENTAL AND SOMATIC DYSFUNCTION OF HEAD REGION: ICD-10-CM

## 2024-04-12 PROCEDURE — 99213 OFFICE O/P EST LOW 20 MIN: CPT | Mod: 25

## 2024-04-12 PROCEDURE — 98926 OSTEOPATH MANJ 3-4 REGIONS: CPT | Mod: GC

## 2024-04-12 RX ORDER — CYCLOBENZAPRINE HCL 5 MG
5 TABLET ORAL 3 TIMES DAILY PRN
Qty: 30 TABLET | Refills: 2 | Status: SHIPPED | OUTPATIENT
Start: 2024-04-12 | End: 2024-04-29

## 2024-04-12 NOTE — PATIENT INSTRUCTIONS
Patient Education   Here is the plan from today's visit    1. TMJ (temporomandibular joint syndrome)  - OSTEOPATHIC MANIP,3-4 BODY REGN    2. Muscle spasm  - cyclobenzaprine (FLEXERIL) 5 MG tablet; Take 1 tablet (5 mg) by mouth 3 times daily as needed for muscle spasms  Dispense: 30 tablet; Refill: 2  - OSTEOPATHIC MANIP,3-4 BODY REGN    3. Segmental and somatic dysfunction of head region  - OSTEOPATHIC MANIP,3-4 BODY REGN    4. Segmental and somatic dysfunction of thoracic region  - OSTEOPATHIC MANIP,3-4 BODY REGN    5. Segmental and somatic dysfunction of cervical region  - OSTEOPATHIC MANIP,3-4 BODY REGN    Please call or return to clinic if your symptoms don't go away.    Follow up plan  Return if symptoms worsen or fail to improve.    Thank you for coming to [facility] today.  Lab Testing:  **If you had lab testing today and your results are reassuring or normal they will be mailed to you or sent through [employer] within 7 days.   **If the lab tests need quick action we will call you with the results.  **If you are having labs done on a different day, please call [phone] to schedule at [facility] or [phone] for other [facility] Outpatient Lab locations. Labs do not offer walk-in appointments.  The phone number we will call with results is # [phone] (home) . If this is not the best number please call our clinic and change the number.  Medication Refills:  If you need any refills please call your pharmacy and they will contact us.   If you need to  your refill at a new pharmacy, please contact the new pharmacy directly. The new pharmacy will help you get your medications transferred faster.   Scheduling:  If you have any concerns about today's visit or wish to schedule another appointment please call our office during normal business hours [phone] (8-5:00 M-F). If you can no longer make a scheduled visit, please cancel via [employer] or call us to cancel.   If a referral was  made to an [facility] specialty provider and you do not get a call from central scheduling, please refer to directions on your visit summary or call our office during normal business hours for assistance.   If a Mammogram was ordered for you at the Breast Center call [phone] to schedule or change your appointment.  If you had an XRay/CT/Ultrasound/MRI ordered the number is [phone] to schedule or change your radiology appointment.   [facility] has limited ultrasound appointments available on Wednesdays, if you would like your ultrasound at [facility], please call [phone] to schedule.   Medical Concerns:  If you have urgent medical concerns please call [phone] at any time of the day.    Rylee Connors, DO

## 2024-04-12 NOTE — PROGRESS NOTES
HPI      Constantino is a 23 year old who presents today for Osteopathic Evaluation.   Chief Complaint   Patient presents with    Pain     Jaw. Pain scale 3. OMT       - Feeling pretty good  - Noticing a marked change over the last week  - Jaw does not hurt nearly as often as it used to  - Still experiencing clicks but not painful  - Some tenderness on base of neck on left side    All active medical problems, med list, PMHx, and social Hx updated and reviewed.    No Known Allergies  Review of Systems       ROS      10 point ROS neg other than the symptoms noted above here or in the HPI.    Physical Exam   There were no vitals filed for this visit.    X-Ray: not indicated.  No results found for any visits on 04/12/24.    Physical Exam  General: Alert and oriented, in no acute distress.  Skin: Warm and dry, no abnormalities noted.  Eyes: Extra-ocular muscles intact, pupils equal and reactive.  ENT: Speech intact, nasal passages open, no hearing impairment noted.  Neck: Supple, no swelling noted.  CV: No cyanosis or pallor, warm and well perfused.  Respiratory: No respiratory distress, no accessory muscle use.  Neuro: Gait and station normal, comprehension intact. Gross and fine motor skills intact.   Psychiatric: Mood and affect appear normal.   Extremities: Warm, able to move all four extremities at will.    Osteopathic Structural Exam and additional MSK exam found below in OMT Procedure Note.  Assessment and Plan     (M26.609) TMJ (temporomandibular joint syndrome)  (primary encounter diagnosis)  (M62.838) Muscle spasm  (M99.00) Segmental and somatic dysfunction of head region  (M99.02) Segmental and somatic dysfunction of thoracic region  (M99.01) Segmental and somatic dysfunction of cervical region  Comment: Given that this has been interfering with the patient's quality of life and ADLs, after discussion, informed consent, and medical assessment for safety, we have together decided to address this concern with  Osteopathic Manipulative Treatment. Please see OMT Procedure Note below for the specifics of treatment.  Plan: cyclobenzaprine (FLEXERIL) 5 MG tablet,         OSTEOPATHIC MANIP,3-4 BODY REGN          Rylee Connors DO, MPH     OMT PROCEDURE NOTE    Body Region: Head, Face, and/or Jaw  Somatic Dysfunction: Tender point over right posterior belly of digastric muscle and the right stylohyoid muscle  Treatment: Indirect Myofascial Release and Counterstrain Techniques.  Outcome: Improved    Body Region: C-spine / Neck  Somatic Dysfunction: Right hypertonic suboccipital fossa  Treatment: Direct Myofascial Release and Soft Tissue Techniques.  Outcome: Improved    Body Region: T-spine and Ribs  Somatic Dysfunction: Muscle spasm over left TP of T1  Treatment: Counterstrain and Soft Tissue Techniques.   Outcome: Improved    The patient actively participated in OMT and was able to communicate both positive and negative feedback throughout. OMT completed without incident. Patient tolerated treatment well. Patient reported that ROM, function, and/or pain level were improved. Advised that pain is occasionally worse during the first 24 hours after treatment and that drinking more water and taking Tylenol or Ibuprofen often help. Patient to return in 2 week/s or as needed for repeat osteopathic assessment.     Rylee Connors DO, MPH

## 2024-04-12 NOTE — Clinical Note
Hi! I am starting to get confused. If I order muscle relaxers to assist with Constantino's pain in their jaw, is that a 25 modifier? Or does that just make this a level 4 visit?

## 2024-04-29 ENCOUNTER — OFFICE VISIT (OUTPATIENT)
Dept: FAMILY MEDICINE | Facility: CLINIC | Age: 24
End: 2024-04-29
Payer: COMMERCIAL

## 2024-04-29 DIAGNOSIS — M25.511 ACUTE PAIN OF RIGHT SHOULDER: Primary | ICD-10-CM

## 2024-04-29 DIAGNOSIS — M62.838 MUSCLE SPASM: ICD-10-CM

## 2024-04-29 DIAGNOSIS — M99.00 SEGMENTAL AND SOMATIC DYSFUNCTION OF HEAD REGION: ICD-10-CM

## 2024-04-29 DIAGNOSIS — M99.02 SEGMENTAL AND SOMATIC DYSFUNCTION OF THORACIC REGION: ICD-10-CM

## 2024-04-29 DIAGNOSIS — M99.01 SEGMENTAL AND SOMATIC DYSFUNCTION OF CERVICAL REGION: ICD-10-CM

## 2024-04-29 DIAGNOSIS — M99.07 SEGMENTAL AND SOMATIC DYSFUNCTION OF UPPER EXTREMITY: ICD-10-CM

## 2024-04-29 PROCEDURE — 99213 OFFICE O/P EST LOW 20 MIN: CPT | Mod: 25

## 2024-04-29 PROCEDURE — 98926 OSTEOPATH MANJ 3-4 REGIONS: CPT | Mod: GC

## 2024-04-29 RX ORDER — CYCLOBENZAPRINE HCL 5 MG
5 TABLET ORAL 3 TIMES DAILY PRN
Qty: 30 TABLET | Refills: 2 | Status: SHIPPED | OUTPATIENT
Start: 2024-04-29 | End: 2024-06-19

## 2024-04-29 NOTE — PROGRESS NOTES
HPI      Constantino is a 23 year old who presents today for Osteopathic Evaluation. No chief complaint on file.    - More pain over the last two weeks than they had previously in the right shoulder  - Last week, did not sleep well  - Elevated stress while they are on a job hunt    All active medical problems, med list, PMHx, and social Hx updated and reviewed.    No Known Allergies  Review of Systems       ROS      10 point ROS neg other than the symptoms noted above here or in the HPI.    Physical Exam   There were no vitals filed for this visit.    X-Ray: not indicated.  No results found for any visits on 04/29/24.    Physical Exam  General: Alert and oriented, in no acute distress.  Skin: Warm and dry, no abnormalities noted.  Eyes: Extra-ocular muscles intact, pupils equal and reactive.  ENT: Speech intact, nasal passages open, no hearing impairment noted.  Neck: Supple, no swelling noted.  CV: No cyanosis or pallor, warm and well perfused.  Respiratory: No respiratory distress, no accessory muscle use.  Neuro: Gait and station normal, comprehension intact. Gross and fine motor skills intact.   Psychiatric: Mood and affect appear normal.   Extremities: Warm, able to move all four extremities at will.    Osteopathic Structural Exam and additional MSK exam found below in OMT Procedure Note.  Assessment and Plan     (M25.511) Acute pain of right shoulder  (primary encounter diagnosis)  (M99.00) Segmental and somatic dysfunction of head region  (M99.02) Segmental and somatic dysfunction of thoracic region  (M99.07) Segmental and somatic dysfunction of upper extremity  (M99.01) Segmental and somatic dysfunction of cervical region  Comment: Given that this has been interfering with the patient's quality of life and ADLs, after discussion, informed consent, and medical assessment for safety, we have together decided to address this concern with Osteopathic Manipulative Treatment. Please see OMT Procedure Note below for the  specifics of treatment.  Plan: OSTEOPATHIC MANIP,3-4 BODY REGN    (M62.838) Muscle spasm  Comment: Refill requested: refilled.  Plan: cyclobenzaprine (FLEXERIL) 5 MG tablet,         OSTEOPATHIC MANIP,3-4 BODY REGN          Rylee Connors DO, MPH     OMT PROCEDURE NOTE    Body Region: Head, Face, and/or Jaw  Somatic Dysfunction: Hypertonic suboccipital fossa bilaterally (R>L)  Treatment: Direct Myofascial Release and Soft Tissue Techniques.  Outcome: Improved    Body Region: C-spine / Neck  Somatic Dysfunction: Hypertonic bilateral cervical extensors  Treatment: Soft Tissue Techniques.  Outcome: Improved    Body Region: T-spine and Ribs  Somatic Dysfunction: T1FR(left)S(left)  Treatment: Direct Muscle Energy and Soft Tissue Techniques.   Outcome: Improved    Body Region: Shoulder, UE and/or Hand  Somatic Dysfunction: Tender point over right trapezius   Treatment: Indirect Myofascial Release, Counterstrain, and Soft Tissue Techniques.  Outcome: Improved    The patient actively participated in OMT and was able to communicate both positive and negative feedback throughout. OMT completed without incident. Patient tolerated treatment well. Patient reported that ROM, function, and/or pain level were improved. Advised that pain is occasionally worse during the first 24 hours after treatment and that drinking more water and taking Tylenol or Ibuprofen often help. Patient to return in 2 week/s or as needed for repeat osteopathic assessment.     Rylee Connors DO, MPH

## 2024-05-01 NOTE — PATIENT INSTRUCTIONS
Patient Education   Here is the plan from today's visit    1. Acute pain of right shoulder  - OSTEOPATHIC MANIP,3-4 BODY REGN    2. Muscle spasm  - cyclobenzaprine (FLEXERIL) 5 MG tablet; Take 1 tablet (5 mg) by mouth 3 times daily as needed for muscle spasms  Dispense: 30 tablet; Refill: 2  - OSTEOPATHIC MANIP,3-4 BODY REGN    3. Segmental and somatic dysfunction of head region  - OSTEOPATHIC MANIP,3-4 BODY REGN    4. Segmental and somatic dysfunction of thoracic region  - OSTEOPATHIC MANIP,3-4 BODY REGN    5. Segmental and somatic dysfunction of upper extremity  - OSTEOPATHIC MANIP,3-4 BODY REGN    6. Segmental and somatic dysfunction of cervical region  - OSTEOPATHIC MANIP,3-4 BODY REGN    Please call or return to clinic if your symptoms don't go away.    Follow up plan  Return if symptoms worsen or fail to improve.    Thank you for coming to [facility] today.  Lab Testing:  **If you had lab testing today and your results are reassuring or normal they will be mailed to you or sent through [facility] within 7 days.   **If the lab tests need quick action we will call you with the results.  **If you are having labs done on a different day, please call [phone] to schedule at [facility] or [phone] for other [facility] locations. Labs do not offer walk-in appointments.  The phone number we will call with results is # [phone] (home) . If this is not the best number please call our clinic and change the number.  Medication Refills:  If you need any refills please call your pharmacy and they will contact us.   If you need to  your refill at a new pharmacy, please contact the new pharmacy directly. The new pharmacy will help you get your medications transferred faster.   Scheduling:  If you have any concerns about today's visit or wish to schedule another appointment please call our office during normal business hours [phone] (8-5:00 M-F). If you can no longer make a  scheduled visit, please cancel via [facility] or call us to cancel.   If a referral was made to an [facility] specialty provider and you do not get a call from central scheduling, please refer to directions on your visit summary or call our office during normal business hours for assistance.   If a Mammogram was ordered for you at the [facility] call [phone] to schedule or change your appointment.  If you had an XRay/CT/Ultrasound/MRI ordered the number is [phone] to schedule or change your radiology appointment.   [facility] has limited ultrasound appointments available on Wednesdays, if you would like your ultrasound at [facility], please call [phone] to schedule.   Medical Concerns:  If you have urgent medical concerns please call [phone] at any time of the day.    Rylee Connors, DO

## 2024-05-01 NOTE — PROGRESS NOTES
Preceptor Attestation:   Patient seen, evaluated and discussed with the resident. I have verified the content of the note, which accurately reflects my assessment of the patient and the plan of care.   Supervising Physician:  Erwin Galindo MD

## 2024-05-10 ENCOUNTER — OFFICE VISIT (OUTPATIENT)
Dept: FAMILY MEDICINE | Facility: CLINIC | Age: 24
End: 2024-05-10
Payer: COMMERCIAL

## 2024-05-10 VITALS
OXYGEN SATURATION: 98 % | HEIGHT: 68 IN | HEART RATE: 137 BPM | TEMPERATURE: 98.3 F | BODY MASS INDEX: 31.4 KG/M2 | SYSTOLIC BLOOD PRESSURE: 144 MMHG | DIASTOLIC BLOOD PRESSURE: 87 MMHG | RESPIRATION RATE: 19 BRPM

## 2024-05-10 DIAGNOSIS — Z11.3 SCREENING EXAMINATION FOR STI: Primary | ICD-10-CM

## 2024-05-10 DIAGNOSIS — Z77.21 CONTACT WITH AND (SUSPECTED) EXPOSURE TO POTENTIALLY HAZARDOUS BODY FLUIDS: ICD-10-CM

## 2024-05-10 DIAGNOSIS — K58.0 IRRITABLE BOWEL SYNDROME WITH DIARRHEA: ICD-10-CM

## 2024-05-10 DIAGNOSIS — R03.0 ELEVATED BLOOD PRESSURE READING WITHOUT DIAGNOSIS OF HYPERTENSION: ICD-10-CM

## 2024-05-10 DIAGNOSIS — R00.0 TACHYCARDIA: ICD-10-CM

## 2024-05-10 PROCEDURE — 99214 OFFICE O/P EST MOD 30 MIN: CPT | Mod: GC

## 2024-05-10 PROCEDURE — 87491 CHLMYD TRACH DNA AMP PROBE: CPT

## 2024-05-10 PROCEDURE — 36415 COLL VENOUS BLD VENIPUNCTURE: CPT

## 2024-05-10 PROCEDURE — 87591 N.GONORRHOEAE DNA AMP PROB: CPT

## 2024-05-10 PROCEDURE — 87389 HIV-1 AG W/HIV-1&-2 AB AG IA: CPT

## 2024-05-10 PROCEDURE — 86780 TREPONEMA PALLIDUM: CPT

## 2024-05-10 RX ORDER — EMTRICITABINE AND TENOFOVIR DISOPROXIL FUMARATE 200; 300 MG/1; MG/1
1 TABLET, FILM COATED ORAL DAILY
Qty: 90 TABLET | Refills: 0 | Status: SHIPPED | OUTPATIENT
Start: 2024-05-10 | End: 2024-08-08

## 2024-05-10 RX ORDER — DICYCLOMINE HYDROCHLORIDE 10 MG/1
10 CAPSULE ORAL 4 TIMES DAILY PRN
Qty: 30 CAPSULE | Refills: 3 | Status: SHIPPED | OUTPATIENT
Start: 2024-05-10
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
## 2024-05-11 LAB
C TRACH DNA SPEC QL PROBE+SIG AMP: NEGATIVE
C TRACH DNA SPEC QL PROBE+SIG AMP: NEGATIVE
HIV 1+2 AB+HIV1 P24 AG SERPL QL IA: NONREACTIVE
N GONORRHOEA DNA SPEC QL NAA+PROBE: NEGATIVE
N GONORRHOEA DNA SPEC QL NAA+PROBE: POSITIVE
T PALLIDUM AB SER QL: NONREACTIVE

## 2024-05-13 ENCOUNTER — TELEPHONE (OUTPATIENT)
Dept: FAMILY MEDICINE | Facility: CLINIC | Age: 24
End: 2024-05-13

## 2024-05-13 ENCOUNTER — ALLIED HEALTH/NURSE VISIT (OUTPATIENT)
Dept: FAMILY MEDICINE | Facility: CLINIC | Age: 24
End: 2024-05-13
Payer: COMMERCIAL

## 2024-05-13 DIAGNOSIS — A54.9 GONORRHEA: Primary | ICD-10-CM

## 2024-05-13 PROCEDURE — 99207 PR NO CHARGE NURSE ONLY: CPT

## 2024-05-13 PROCEDURE — 96372 THER/PROPH/DIAG INJ SC/IM: CPT

## 2024-05-13 RX ORDER — CEFTRIAXONE SODIUM 1 G
500 VIAL (EA) INJECTION ONCE
Status: COMPLETED | OUTPATIENT
Start: 2024-05-13 | End: 2024-05-13

## 2024-05-13 RX ADMIN — Medication 500 MG: at 14:38

## 2024-05-13 NOTE — PROGRESS NOTES
Clinic Administered Medication Documentation        Patient was given Rocephin 500mg. Prior to medication administration, verified patient's identity using patient s name and date of birth. Please see MAR and medication order for additional information. Patient instructed to remain in clinic for 15 minutes and report any adverse reaction to staff immediately.    Vial/Syringe: Single dose vial. Was entire vial of medication used? Yes

## 2024-05-13 NOTE — TELEPHONE ENCOUNTER
Attempted to call patient to find out what they are requesting the medication for, left message to call back. Also sent a My Chart message    Evelina Foster RN

## 2024-05-13 NOTE — TELEPHONE ENCOUNTER
[facility] phone call message- medication clarification/question:    Full Medication Name: Doxycycline   Dose: n/a    Question: Patient would like a prescription for this medication sent to the pharmacy. Patient was requesting the injection but we do not do it here at [facility].    Pharmacy confirmed as : [facility] - [place], [place] - [address] [2022]  Yes    OK to leave a message on voice mail? Yes    Primary language: English      needed? No    Call taken on May 13, 2024 at 8:42 AM by Irasema Mcintyre

## 2024-05-24 ENCOUNTER — OFFICE VISIT (OUTPATIENT)
Dept: FAMILY MEDICINE | Facility: CLINIC | Age: 24
End: 2024-05-24
Payer: COMMERCIAL

## 2024-05-24 VITALS — HEIGHT: 68 IN | BODY MASS INDEX: 31.4 KG/M2 | RESPIRATION RATE: 19 BRPM

## 2024-05-24 DIAGNOSIS — Z77.21 CONTACT WITH AND (SUSPECTED) EXPOSURE TO POTENTIALLY HAZARDOUS BODY FLUIDS: Primary | Chronic | ICD-10-CM

## 2024-05-24 DIAGNOSIS — A54.9 GONORRHEA: ICD-10-CM

## 2024-05-24 LAB
C TRACH DNA SPEC QL PROBE+SIG AMP: NEGATIVE
N GONORRHOEA DNA SPEC QL NAA+PROBE: NEGATIVE

## 2024-05-24 PROCEDURE — 99213 OFFICE O/P EST LOW 20 MIN: CPT | Mod: GC

## 2024-05-24 PROCEDURE — 87491 CHLMYD TRACH DNA AMP PROBE: CPT

## 2024-05-24 PROCEDURE — 87591 N.GONORRHOEAE DNA AMP PROB: CPT

## 2024-05-24 RX ORDER — DOXYCYCLINE 100 MG/1
CAPSULE ORAL
Qty: 30 CAPSULE | Refills: 3 | Status: SHIPPED | OUTPATIENT
Start: 2024-05-24 | End: 2024-08-08

## 2024-05-24 NOTE — PROGRESS NOTES
"Assessment & Plan     Constantino is a 23 year old patient who presents for the following concerns:      Candidate and currently on HIV prep  Candidate for doxy pep based on bacterial STI positivity x4 in the last 2 years. Counseled on side effects and risk of resistance.   - doxycycline hyclate (VIBRAMYCIN) 100 MG capsule  Dispense: 30 capsule; Refill: 3 - Take 200mg (2 tablets) within the first 24 hours after having sex but can be taken within 3 days. No more than 1 dose in 24 hours.     Gonorrhea  Here for test of cure of gonorrhea of the throat diagnosed 5/10/2024, received rocephin on 5/13/24. No symptoms. Counseled on safe sex practices. Partners notified by patient.   - Chlamydia trachomatis/Neisseria gonorrhoeae by PCR - Clinic Collect      Post Medication Reconciliation Status: medications reconciled and changed, per note/orders    No follow-ups on file.    More Lorenzo MD  [facility]    Subjective         5/24/2024    10:04 AM   Additional Questions   Roomed by Lyrik   Accompanied by self         5/24/2024    Information    services provided? No       This is a 23 year old patient, presenting for the following health concerns:    Test of cure   No symptoms  Sexual history: intercourse about 2x week and usually with different partners      Review of Systems   Constitutional, HEENT, cardiovascular, pulmonary, gi and gu systems are negative, except as otherwise noted.      Objective    Resp 19   Ht 1.727 m (5' 8\")   BMI 31.40 kg/m    Wt Readings from Last 4 Encounters:   02/20/24 93.7 kg (206 lb 8 oz)   02/15/24 93.6 kg (206 lb 6.4 oz)       Physical Exam    General: Alert and oriented, in no acute distress.  CV: No cyanosis or pallor, warm and well perfused.  Respiratory: No respiratory distress, no accessory muscle use.  Psychiatric: Mood and affect appear normal.       Results from last visit:  Office Visit on 05/10/2024   Component Date Value Ref Range Status    " Chlamydia Trachomatis 05/10/2024 Negative  Negative Final    Negative for C. trachomatis rRNA by transcription mediated amplification.   A negative result by transcription mediated amplification does not preclude the presence of infection because results are dependent on proper and adequate collection, absence of inhibitors and sufficient rRNA to be detected.    Neisseria gonorrhoeae 05/10/2024 Negative  Negative Final    Negative for N. gonorrhoeae rRNA by transcription mediated amplification. A negative result by transcription mediated amplification does not preclude the presence of C. trachomatis infection because results are dependent on proper and adequate collection, absence of inhibitors and sufficient rRNA to be detected.    HIV Antigen Antibody Combo 05/10/2024 Nonreactive  Nonreactive Final    Negative HIV-1 p24 antigen and HIV-1/2 antibody screening test results usually indicate the absence of HIV-1 and HIV-2 infection. However, such negative results do not rule-out acute HIV infection.  If acute HIV-1 or HIV-2 infection is suspected, detection of HIV-1 or HIV-2 RNA  is recommended.     Treponema Antibody Total 05/10/2024 Nonreactive  Nonreactive Final    Chlamydia Trachomatis 05/10/2024 Negative  Negative Final    Negative for C. trachomatis rRNA by transcription mediated amplification.   A negative result by transcription mediated amplification does not preclude the presence of infection because results are dependent on proper and adequate collection, absence of inhibitors and sufficient rRNA to be detected.    Neisseria gonorrhoeae 05/10/2024 Positive (A)  Negative Final    Positive for N. gonorrhoeae rRNA by transcription mediated amplification. As is true for all non-culture methods, a positive specimen obtained from a patient after therapeutic treatment cannot be interpreted as indicating the presence of viable N. gonorrhoeae.       More Lorenzo MD on 5/24/2024 at 10:43 AM    ----- Service Performed  and Documented by Resident or Fellow ------            .

## 2024-05-24 NOTE — PATIENT INSTRUCTIONS
Take 200mg (2 tablets) within the first 24 hours after having sex but can be taken within 3 days. No more than 1 dose in 24 hours.

## 2024-05-25 NOTE — PROGRESS NOTES
Preceptor Attestation:    I discussed the patient with the resident and evaluated the patient in person. I have verified the content of the note, which accurately reflects my assessment of the patient and the plan of care.   Supervising Physician:  Susan Luevano MD.

## 2024-06-19 DIAGNOSIS — M62.838 MUSCLE SPASM: ICD-10-CM

## 2024-06-19 RX ORDER — CYCLOBENZAPRINE HCL 5 MG
5 TABLET ORAL 3 TIMES DAILY PRN
Qty: 30 TABLET | Refills: 2 | Status: SHIPPED | OUTPATIENT
Start: 2024-06-19 | End: 2024-07-19

## 2024-06-19 NOTE — TELEPHONE ENCOUNTER
Verify that the refill encounter hasn't been started Yes    [facility] phone call message- patient requesting a refill:    Full Medication Name: cyclobenzaprine (FLEXERIL) 5 MG tablet     Dose:      Pharmacy confirmed as   [facility] [place], [place] - [address] [facility] [address]  Phone: [phone] Fax: [phone]  : Yes    Medication tab checked to see if medication has been sent  Yes    Additional Comments: pt out of med's  request refill     OK to leave a message on voice mail? Yes    Advised patient refill may take up to 2 business days? Yes    Primary language: English      needed? No    Call taken on June 19, 2024 at 11:58 AM by STEVENSON Hendrix    Route to [facility] REFILL

## 2024-06-19 NOTE — TELEPHONE ENCOUNTER
"Last seen 5/24/24    Request for medication refill:    cyclobenzaprine (FLEXERIL) 5 MG tablet     Providers if patient needs an appointment and you are willing to give a one month supply please refill for one month and  send a letter/MyChart using \".SMILLIMITEDREFILL\" .smillimited and route chart to \"P [facility] \" (Giving one month refill in non controlled medications is strongly recommended before denial)    If refill has been denied, meaning absolutely no refills without visit, please complete the smart phrase \".smirxrefuse\" and route it to the \"P S[facility] MED REFILLS\"  pool to inform the patient and the pharmacy.    Antonia Acosta RN     "

## 2024-07-19 DIAGNOSIS — M62.838 MUSCLE SPASM: ICD-10-CM

## 2024-07-19 RX ORDER — CYCLOBENZAPRINE HCL 5 MG
5 TABLET ORAL 3 TIMES DAILY PRN
Qty: 30 TABLET | Refills: 2 | Status: SHIPPED | OUTPATIENT
Start: 2024-07-19 | End: 2024-08-08

## 2024-07-19 NOTE — TELEPHONE ENCOUNTER
[facility] phone call message- patient requesting a refill:    Full Medication Name: cyclobenzaprine (FLEXERIL) 5 MG tablet         Pharmacy confirmed as   [facility] [place], [place] - [address]  [facility] [address]  Phone: [phone] Fax: [phone]    : Yes    Additional Comments: The patient is requesting a refill.     OK to leave a message on voice mail? Yes    Primary language: English      needed? No    Call taken on July 19, 2024 at 11:02 AM by Deborah Prado

## 2024-07-19 NOTE — TELEPHONE ENCOUNTER

## 2024-07-26 ENCOUNTER — TELEPHONE (OUTPATIENT)
Dept: FAMILY MEDICINE | Facility: CLINIC | Age: 24
End: 2024-07-26
Payer: COMMERCIAL

## 2024-07-26 NOTE — TELEPHONE ENCOUNTER
"Patient warm transferred to me by .    Patient states they took doxycycline this morning and then threw up and a nose bleed started, dripping into the toilet..    Patient has taken doxycycline \"at least 10 times\" since it was prescribed in 5/2024 and this has never happened.    I asked patient if this could be from anything else, they said that they did \"swallow semen this morning\" but that is not unusual, patient had not eaten anything yet this morning.    Patient said that they do not feel nauseated any longer and the nose bleed has stopped. Patient states last time they took the medication was about a week ago with not reaction.    I let patient know that I would send message to provider but that it is unusual for a reaction to come on after having taken the medication so much, but just to be safe, do not take any more until we hear back from Dr. Lorenzo.    Patient expressed understanding    Evelina Foster RN        " Resident

## 2024-08-08 ENCOUNTER — OFFICE VISIT (OUTPATIENT)
Dept: FAMILY MEDICINE | Facility: CLINIC | Age: 24
End: 2024-08-08
Payer: COMMERCIAL

## 2024-08-08 VITALS
BODY MASS INDEX: 31.4 KG/M2 | OXYGEN SATURATION: 100 % | HEART RATE: 81 BPM | HEIGHT: 68 IN | RESPIRATION RATE: 17 BRPM | TEMPERATURE: 98.3 F

## 2024-08-08 DIAGNOSIS — M62.838 MUSCLE SPASM: ICD-10-CM

## 2024-08-08 DIAGNOSIS — R03.0 ELEVATED BLOOD PRESSURE READING WITHOUT DIAGNOSIS OF HYPERTENSION: ICD-10-CM

## 2024-08-08 DIAGNOSIS — Z77.21 CONTACT WITH AND (SUSPECTED) EXPOSURE TO POTENTIALLY HAZARDOUS BODY FLUIDS: Primary | Chronic | ICD-10-CM

## 2024-08-08 LAB
HIV 1+2 AB+HIV1 P24 AG SERPL QL IA: NONREACTIVE
T PALLIDUM AB SER QL: NONREACTIVE

## 2024-08-08 PROCEDURE — 87591 N.GONORRHOEAE DNA AMP PROB: CPT

## 2024-08-08 PROCEDURE — 86780 TREPONEMA PALLIDUM: CPT

## 2024-08-08 PROCEDURE — 87491 CHLMYD TRACH DNA AMP PROBE: CPT

## 2024-08-08 PROCEDURE — 36415 COLL VENOUS BLD VENIPUNCTURE: CPT

## 2024-08-08 PROCEDURE — 87389 HIV-1 AG W/HIV-1&-2 AB AG IA: CPT

## 2024-08-08 PROCEDURE — 99214 OFFICE O/P EST MOD 30 MIN: CPT | Mod: GC

## 2024-08-08 RX ORDER — CYCLOBENZAPRINE HCL 5 MG
5 TABLET ORAL 3 TIMES DAILY PRN
Qty: 30 TABLET | Refills: 2 | Status: SHIPPED | OUTPATIENT
Start: 2024-08-08

## 2024-08-08 RX ORDER — DOXYCYCLINE 100 MG/1
CAPSULE ORAL
Qty: 30 CAPSULE | Refills: 3 | Status: SHIPPED | OUTPATIENT
Start: 2024-08-08

## 2024-08-08 RX ORDER — EMTRICITABINE AND TENOFOVIR DISOPROXIL FUMARATE 200; 300 MG/1; MG/1
1 TABLET, FILM COATED ORAL DAILY
Qty: 90 TABLET | Refills: 0 | Status: SHIPPED | OUTPATIENT
Start: 2024-08-08

## 2024-08-08 NOTE — PROGRESS NOTES
Preceptor Attestation:    I discussed the patient with the resident and evaluated the patient in person. I have verified the content of the note, which accurately reflects my assessment of the patient and the plan of care.   Supervising Physician:  Kameron Eddy MD, MD.
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
## 2024-08-09 PROBLEM — I10 HYPERTENSION, UNSPECIFIED TYPE: Status: RESOLVED | Noted: 2024-02-28 | Resolved: 2024-08-09

## 2024-08-09 PROBLEM — R03.0 ELEVATED BLOOD PRESSURE READING WITHOUT DIAGNOSIS OF HYPERTENSION: Status: ACTIVE | Noted: 2024-08-09

## 2024-08-09 LAB
C TRACH DNA SPEC QL PROBE+SIG AMP: NEGATIVE
N GONORRHOEA DNA SPEC QL NAA+PROBE: NEGATIVE

## 2024-11-11 ENCOUNTER — OFFICE VISIT (OUTPATIENT)
Dept: FAMILY MEDICINE | Facility: CLINIC | Age: 24
End: 2024-11-11
Payer: COMMERCIAL

## 2024-11-11 VITALS
TEMPERATURE: 97.9 F | SYSTOLIC BLOOD PRESSURE: 139 MMHG | OXYGEN SATURATION: 96 % | BODY MASS INDEX: 31.4 KG/M2 | RESPIRATION RATE: 14 BRPM | DIASTOLIC BLOOD PRESSURE: 85 MMHG | HEIGHT: 68 IN | HEART RATE: 132 BPM

## 2024-11-11 DIAGNOSIS — M62.838 MUSCLE SPASM: ICD-10-CM

## 2024-11-11 DIAGNOSIS — Z11.3 SCREENING EXAMINATION FOR STI: Primary | ICD-10-CM

## 2024-11-11 DIAGNOSIS — Z77.21 CONTACT WITH AND (SUSPECTED) EXPOSURE TO POTENTIALLY HAZARDOUS BODY FLUIDS: Chronic | ICD-10-CM

## 2024-11-11 LAB
CREAT SERPL-MCNC: 0.88 MG/DL (ref 0.51–1.17)
EGFRCR SERPLBLD CKD-EPI 2021: >90 ML/MIN/1.73M2
HIV 1+2 AB+HIV1 P24 AG SERPL QL IA: NONREACTIVE
T PALLIDUM AB SER QL: NONREACTIVE

## 2024-11-11 RX ORDER — CYCLOBENZAPRINE HCL 5 MG
5 TABLET ORAL 3 TIMES DAILY PRN
Qty: 30 TABLET | Refills: 2 | Status: SHIPPED | OUTPATIENT
Start: 2024-11-11 | End: 2024-11-11

## 2024-11-11 RX ORDER — CYCLOBENZAPRINE HCL 5 MG
5 TABLET ORAL 3 TIMES DAILY PRN
Qty: 270 TABLET | Refills: 3 | Status: SHIPPED | OUTPATIENT
Start: 2024-11-11

## 2024-11-11 RX ORDER — EMTRICITABINE AND TENOFOVIR DISOPROXIL FUMARATE 200; 300 MG/1; MG/1
1 TABLET, FILM COATED ORAL DAILY
Qty: 90 TABLET | Refills: 0 | Status: SHIPPED | OUTPATIENT
Start: 2024-11-11

## 2024-11-11 NOTE — Clinical Note
Is there a way to cancel the GC rectal swab? marked as collected so label could be printed per clinic workflow but patient decided just to do throat swab.

## 2024-11-11 NOTE — PROGRESS NOTES
Assessment & Plan     Candidate and currently on HIV prep  Constantino is here for follow up concerning pre-exposure prophylaxis for HIV. Patient is member of high prevalence group (MSM, non-monogmous partnership): yes. Last Cr was WNL, due for repeat today. Last HIV test was negative.   - emtricitabine-tenofovir (TRUVADA) 200-300 MG per tablet; Take 1 tablet by mouth daily.    Muscle spasm  Refill   - cyclobenzaprine (FLEXERIL) 5 MG tablet; Take 1 tablet (5 mg) by mouth 3 times daily as needed for muscle spasms.    Screening examination for STI  Asymptomatic.   - First voided urine-Chlamydia trachomatis/Neisseria gonorrhoeae by PCR; Future  - CANCEL - Rectum-Chlamydia trachomatis/Neisseria gonorrhoeae by PCR  - HIV Antigen Antibody Combo Cascade; Future  - Treponema Abs w Reflex to RPR and Titer; Future  - Throat-Chlamydia trachomatis/Neisseria gonorrhoeae by PCR    Tachycardia  Elevated BP without diagnosis of HTN  Due to anxiety in office. See prior notes        Return in about 3 months (around 2/11/2025) for Follow up, with me.    Subjective   Constantino is a 24 year old, presenting for the following health issues:  Follow Up (Prep) and Screening (STI)        11/11/2024     9:47 AM   Additional Questions   Roomed by Nilo         11/11/2024    Information    services provided? No        HPI     HIV Prevention / PrEP - Follow up  Constantino is here for follow up concerning pre-exposure prophylaxis for HIV.     Ongoing risk factors for acquiring HIV infection:  Patient is member of high prevalence group (MSM, non-monogmous partnership): no/yes: yes  Any anal sex without condoms in the past 6 months: no/yes: YES  Has a current partner that is HIV positive: no/yes: no   ANY Bacterial STI in the last 6 months: no/yes: YES          Review of Systems   DENIES: Denies lymphadenopathy,fevers,chills,rigors,night sweats,weight loss,oral thrush,mouth lesions,dyspnea,cough,diarrhea, edema.  DENIES: Denies  discharge,  "rash, genital lesions.                  Objective    /85   Pulse (!) 132   Temp 97.9  F (36.6  C) (Temporal)   Resp 14   Ht 1.727 m (5' 8\")   SpO2 96%   BMI 31.40 kg/m    Body mass index is 31.4 kg/m .    Physical Exam   General: Alert and oriented, in no acute distress.  Skin: Warm and dry, no abnormalities noted.  Eyes: Extra-ocular muscles grossly intact, pupils equal.  ENT: Speech intact, nasal passages open, no hearing impairment noted.  CV: No cyanosis or pallor, warm and well perfused.  Respiratory: No respiratory distress, no accessory muscle use.  Neuro: Gait and station normal, comprehension intact. Gross and fine motor skills intact.   Psychiatric: Mood and affect appear normal.   Extremities: Warm, able to move all four extremities at will.      Office Visit on 08/08/2024   Component Date Value Ref Range Status    Chlamydia Trachomatis 08/08/2024 Negative  Negative Final    Negative for C. trachomatis rRNA by transcription mediated amplification.   A negative result by transcription mediated amplification does not preclude the presence of infection because results are dependent on proper and adequate collection, absence of inhibitors and sufficient rRNA to be detected.    Neisseria gonorrhoeae 08/08/2024 Negative  Negative Final    Negative for N. gonorrhoeae rRNA by transcription mediated amplification. A negative result by transcription mediated amplification does not preclude the presence of C. trachomatis infection because results are dependent on proper and adequate collection, absence of inhibitors and sufficient rRNA to be detected.    Chlamydia Trachomatis 08/08/2024 Negative  Negative Final    Negative for C. trachomatis rRNA by transcription mediated amplification.   A negative result by transcription mediated amplification does not preclude the presence of infection because results are dependent on proper and adequate collection, absence of inhibitors and sufficient rRNA to be " detected.    Neisseria gonorrhoeae 08/08/2024 Negative  Negative Final    Negative for N. gonorrhoeae rRNA by transcription mediated amplification. A negative result by transcription mediated amplification does not preclude the presence of C. trachomatis infection because results are dependent on proper and adequate collection, absence of inhibitors and sufficient rRNA to be detected.    Chlamydia Trachomatis 08/08/2024 Negative  Negative Final    Negative for C. trachomatis rRNA by transcription mediated amplification.   A negative result by transcription mediated amplification does not preclude the presence of infection because results are dependent on proper and adequate collection, absence of inhibitors and sufficient rRNA to be detected.    Neisseria gonorrhoeae 08/08/2024 Negative  Negative Final    Negative for N. gonorrhoeae rRNA by transcription mediated amplification. A negative result by transcription mediated amplification does not preclude the presence of C. trachomatis infection because results are dependent on proper and adequate collection, absence of inhibitors and sufficient rRNA to be detected.    HIV Antigen Antibody Combo 08/08/2024 Nonreactive  Nonreactive Final    Negative HIV-1 p24 antigen and HIV-1/2 antibody screening test results usually indicate the absence of HIV-1 and HIV-2 infection. However, such negative results do not rule-out acute HIV infection.  If acute HIV-1 or HIV-2 infection is suspected, detection of HIV-1 or HIV-2 RNA  is recommended.     Treponema Antibody Total 08/08/2024 Nonreactive  Nonreactive Final           Signed Electronically by: More Lorenzo MD

## 2024-11-12 LAB
C TRACH DNA SPEC QL PROBE+SIG AMP: NEGATIVE
C TRACH DNA SPEC QL PROBE+SIG AMP: NEGATIVE
N GONORRHOEA DNA SPEC QL NAA+PROBE: NEGATIVE
N GONORRHOEA DNA SPEC QL NAA+PROBE: NEGATIVE

## 2025-02-03 SDOH — HEALTH STABILITY: PHYSICAL HEALTH: ON AVERAGE, HOW MANY MINUTES DO YOU ENGAGE IN EXERCISE AT THIS LEVEL?: 40 MIN

## 2025-02-03 SDOH — HEALTH STABILITY: PHYSICAL HEALTH: ON AVERAGE, HOW MANY DAYS PER WEEK DO YOU ENGAGE IN MODERATE TO STRENUOUS EXERCISE (LIKE A BRISK WALK)?: 5 DAYS

## 2025-02-03 ASSESSMENT — SOCIAL DETERMINANTS OF HEALTH (SDOH): HOW OFTEN DO YOU GET TOGETHER WITH FRIENDS OR RELATIVES?: THREE TIMES A WEEK

## 2025-02-06 ENCOUNTER — OFFICE VISIT (OUTPATIENT)
Dept: FAMILY MEDICINE | Facility: CLINIC | Age: 25
End: 2025-02-06
Payer: COMMERCIAL

## 2025-02-06 VITALS
HEIGHT: 68 IN | BODY MASS INDEX: 31.4 KG/M2 | TEMPERATURE: 98.4 F | HEART RATE: 88 BPM | OXYGEN SATURATION: 98 % | RESPIRATION RATE: 16 BRPM

## 2025-02-06 DIAGNOSIS — M62.838 MUSCLE SPASM: ICD-10-CM

## 2025-02-06 DIAGNOSIS — Z00.00 ROUTINE GENERAL MEDICAL EXAMINATION AT A HEALTH CARE FACILITY: Primary | ICD-10-CM

## 2025-02-06 DIAGNOSIS — Z23 HIGH PRIORITY FOR 2019-NCOV VACCINE: ICD-10-CM

## 2025-02-06 DIAGNOSIS — Z23 NEED FOR PROPHYLACTIC VACCINATION AND INOCULATION AGAINST INFLUENZA: ICD-10-CM

## 2025-02-06 DIAGNOSIS — Z11.3 SCREENING EXAMINATION FOR STI: ICD-10-CM

## 2025-02-06 DIAGNOSIS — Z30.2 REQUEST FOR STERILIZATION: ICD-10-CM

## 2025-02-06 DIAGNOSIS — Z77.21 CONTACT WITH AND (SUSPECTED) EXPOSURE TO POTENTIALLY HAZARDOUS BODY FLUIDS: Chronic | ICD-10-CM

## 2025-02-06 LAB
ANION GAP SERPL CALCULATED.3IONS-SCNC: 11 MMOL/L (ref 7–15)
BUN SERPL-MCNC: 10.4 MG/DL (ref 6–20)
CALCIUM SERPL-MCNC: 10.1 MG/DL (ref 8.8–10.4)
CHLORIDE SERPL-SCNC: 105 MMOL/L (ref 98–107)
CREAT SERPL-MCNC: 0.89 MG/DL (ref 0.51–1.17)
EGFRCR SERPLBLD CKD-EPI 2021: >90 ML/MIN/1.73M2
GLUCOSE SERPL-MCNC: 100 MG/DL (ref 70–99)
HCO3 SERPL-SCNC: 26 MMOL/L (ref 22–29)
HCV AB SERPL QL IA: NONREACTIVE
HIV 1+2 AB+HIV1 P24 AG SERPL QL IA: NONREACTIVE
POTASSIUM SERPL-SCNC: 4.5 MMOL/L (ref 3.4–5.3)
SODIUM SERPL-SCNC: 142 MMOL/L (ref 135–145)
T PALLIDUM AB SER QL: NONREACTIVE

## 2025-02-06 RX ORDER — DOXYCYCLINE 100 MG/1
CAPSULE ORAL
Qty: 30 CAPSULE | Refills: 3 | Status: SHIPPED | OUTPATIENT
Start: 2025-02-06

## 2025-02-06 RX ORDER — CYCLOBENZAPRINE HCL 5 MG
5 TABLET ORAL 3 TIMES DAILY PRN
Qty: 270 TABLET | Refills: 3 | Status: SHIPPED | OUTPATIENT
Start: 2025-02-06

## 2025-02-06 RX ORDER — EMTRICITABINE AND TENOFOVIR DISOPROXIL FUMARATE 200; 300 MG/1; MG/1
1 TABLET, FILM COATED ORAL DAILY
Qty: 90 TABLET | Refills: 0 | Status: SHIPPED | OUTPATIENT
Start: 2025-02-06

## 2025-02-06 NOTE — PATIENT INSTRUCTIONS
Patient Education   Preventive Care Advice   This is general advice given by our system to help you stay healthy. However, your care team may have specific advice just for you. Please talk to your care team about your preventive care needs.  Nutrition  Eat 5 or more servings of fruits and vegetables each day.  Try wheat bread, brown rice and whole grain pasta (instead of white bread, rice, and pasta).  Get enough calcium and vitamin D. Check the label on foods and aim for 100% of the RDA (recommended daily allowance).  Lifestyle  Exercise at least 150 minutes each week  (30 minutes a day, 5 days a week).  Do muscle strengthening activities 2 days a week. These help control your weight and prevent disease.  No smoking.  Wear sunscreen to prevent skin cancer.  Have a dental exam and cleaning every 6 months.  Yearly exams  See your health care team every year to talk about:  Any changes in your health.  Any medicines your care team has prescribed.  Preventive care, family planning, and ways to prevent chronic diseases.  Shots (vaccines)   HPV shots (up to age 26), if you've never had them before.  Hepatitis B shots (up to age 59), if you've never had them before.  COVID-19 shot: Get this shot when it's due.  Flu shot: Get a flu shot every year.  Tetanus shot: Get a tetanus shot every 10 years.  Pneumococcal, hepatitis A, and RSV shots: Ask your care team if you need these based on your risk.  Shingles shot (for age 50 and up)  General health tests  Diabetes screening:  Starting at age 35, Get screened for diabetes at least every 3 years.  If you are younger than age 35, ask your care team if you should be screened for diabetes.  Cholesterol test: At age 39, start having a cholesterol test every 5 years, or more often if advised.  Bone density scan (DEXA): At age 50, ask your care team if you should have this scan for osteoporosis (brittle bones).  Hepatitis C: Get tested at least once in your life.  STIs (sexually  transmitted infections)  Before age 24: Ask your care team if you should be screened for STIs.  After age 24: Get screened for STIs if you're at risk. You are at risk for STIs (including HIV) if:  You are sexually active with more than one person.  You don't use condoms every time.  You or a partner was diagnosed with a sexually transmitted infection.  If you are at risk for HIV, ask about PrEP medicine to prevent HIV.  Get tested for HIV at least once in your life, whether you are at risk for HIV or not.  Cancer screening tests  Cervical cancer screening: If you have a cervix, begin getting regular cervical cancer screening tests starting at age 21.  Breast cancer scan (mammogram): If you've ever had breasts, begin having regular mammograms starting at age 40. This is a scan to check for breast cancer.  Colon cancer screening: It is important to start screening for colon cancer at age 45.  Have a colonoscopy test every 10 years (or more often if you're at risk) Or, ask your provider about stool tests like a FIT test every year or Cologuard test every 3 years.  To learn more about your testing options, visit:   .  For help making a decision, visit:   https://bit.ly/nm09194.  Prostate cancer screening test: If you have a prostate, ask your care team if a prostate cancer screening test (PSA) at age 55 is right for you.  Lung cancer screening: If you are a current or former smoker ages 50 to 80, ask your care team if ongoing lung cancer screenings are right for you.  For informational purposes only. Not to replace the advice of your health care provider. Copyright   2023 [facility]. All rights reserved. Clinically reviewed by the [employer] [facility]. shoutr [id number] - REV 01/24.  Eating Healthy Foods: Care Instructions  With every meal, you can make healthy food choices. Try to eat a variety of fruits, vegetables, whole grains, lean proteins, and low-fat dairy products. This can help  "you get the right balance of nutrients, including vitamins and minerals. Small changes add up over time. You can start by adding one healthy food to your meals each day.    Try to make half your plate fruits and vegetables, one-fourth whole grains, and one-fourth lean proteins. Try including dairy with your meals.   Eat more fruits and vegetables. Try to have them with most meals and snacks.   Foods for healthy eating        Fruits   These can be fresh, frozen, canned, or dried.  Try to choose whole fruit rather than fruit juice.  Eat a variety of colors.        Vegetables   These can be fresh, frozen, canned, or dried.  Beans, peas, and lentils count too.        Whole grains   Choose whole-grain breads, cereals, and noodles.  Try brown rice.        Lean proteins   These can include lean meat, poultry, fish, and eggs.  You can also have tofu, beans, peas, lentils, nuts, and seeds.        Dairy   Try milk, yogurt, and cheese.  Choose low-fat or fat-free when you can.  If you need to, use lactose-free milk or fortified plant-based milk products, such as soy milk.        Water   Drink water when you're thirsty.  Limit sugar-sweetened drinks, including soda, fruit drinks, and sports drinks.  Where can you learn more?  Go to https://www.[employer].net/patiented  Enter T756 in the search box to learn more about \"Eating Healthy Foods: Care Instructions.\"  Current as of: September 20, 2023  Content Version: 14.3    2024 [employer].   Care instructions adapted under license by your healthcare professional. If you have questions about a medical condition or this instruction, always ask your healthcare professional. [employer] disclaims any warranty or liability for your use of this information.       "

## 2025-02-06 NOTE — PROGRESS NOTES
Preventive Care Visit  [facility] RAFAEL Loernzo MD, Family Medicine  Feb 6, 2025      Assessment & Plan     Routine general medical examination at a health care facility  Discussed routine preventative items with special attention to healthy diet, dental care, STI prevention and testing, and mental health.     Request for sterilization  Desires permanent contraception, interested in vasectomy  -     Adult Urology  Referral; Future    Candidate and currently on HIV prep  Refill. Asymptomatic.   - emtricitabine-tenofovir (TRUVADA) 200-300 MG per tablet; Take 1 tablet by mouth daily.  - doxycycline hyclate (VIBRAMYCIN) 100 MG capsule; Take 200mg (2 tablets) within the first 24 hours after having sex but can be taken within 3 days. No more than 1 dose in 24 hours.  - BASIC METABOLIC PANEL; Future  - Next HIV due: 2/11/25  - Next Cr due: 8/11/25    Muscle spasm  Refill, did not discuss today  -     cyclobenzaprine (FLEXERIL) 5 MG tablet; Take 1 tablet (5 mg) by mouth 3 times daily as needed for muscle spasms.    Screening examination for STI  Routine, asymptomatic.  No known exposures  -     Chlamydia trachomatis/Neisseria gonorrhoeae by PCR - Clinic Collect  -     HIV Antigen Antibody Combo; Future  -     Hepatitis C Screen Reflex to HCV RNA Quant and Genotype; Future  -     Treponema Abs w Reflex to RPR and Titer; Future  -     HIV Antigen Antibody Combo  -     Hepatitis C Screen Reflex to HCV RNA Quant and Genotype  -     Treponema Abs w Reflex to RPR and Titer  -     Chlamydia trachomatis/Neisseria gonorrhoeae by PCR - Clinic Collect    Need for prophylactic vaccination and inoculation against influenza  -     INFLUENZA VACCINE,SPLIT VIRUS,TRIVALENT,PF(FLUZONE)    High priority for 2019-nCoV vaccine  -     COVID-19 12+ (PFIZER)    Counseling  Appropriate preventive services were addressed with this patient via screening, questionnaire, or discussion as appropriate for fall prevention,  nutrition, physical activity, Tobacco-use cessation, social engagement, weight loss and cognition.  Checklist reviewing preventive services available has been given to the patient.  Reviewed patient's diet, addressing concerns and/or questions.   The patient was instructed to see the dentist every 6 months.   Trace is at risk for psychosocial distress and has been provided with information to reduce risk.       Return in about 3 months (around 5/6/2025) for prep.    Nj Meeks is a 24 year old, presenting for the following:  Physical, Imm/Inj (Flu Shot), and Imm/Inj (COVID-19 VACCINE)        2/6/2025     9:01 AM   Additional Questions   Roomed by Nilo         2/6/2025    Information    services provided? No          HPI    Overall doing well  No concerns  New partner  Changed name -goes by Trace      HIV Prevention / PrEP - Follow up  Trace is here for follow up concerning pre-exposure prophylaxis for HIV.    Ongoing risk factors for acquiring HIV infection:  Patient is member of high prevalence group (MSM, non-monogmous partnership): no/yes: YES  Any anal sex without condoms in the past 6 months: no/yes: no   Has a current partner that is HIV positive: no/yes: no   ANY Bacterial STI in the last 6 months: no, last bacterial STI 5/10/24, started doxy pep after that    Last HIV test: pos/neg: negative Date: 11/11/24        Review of Systems   DENIES: Denies lymphadenopathy,fevers,chills,rigors,night sweats,weight loss,oral thrush,mouth lesions,dyspnea,cough,diarrhea, edema.  DENIES: Denies  discharge, rash, genital lesions.          Health Care Directive  Patient does not have a Health Care Directive: Discussed advance care planning with patient; information given to patient to review.      2/3/2025   General Health   How would you rate your overall physical health? Good   Feel stress (tense, anxious, or unable to sleep) To some extent   (!) STRESS CONCERN      2/3/2025   Nutrition   Three  "or more servings of calcium each day? (!) NO   Diet: Regular (no restrictions)   How many servings of fruit and vegetables per day? (!) 0-1   How many sweetened beverages each day? (!) 3         2/3/2025   Exercise   Days per week of moderate/strenous exercise 5 days   Average minutes spent exercising at this level 40 min         2/3/2025   Social Factors   Frequency of gathering with friends or relatives Three times a week   Worry food won't last until get money to buy more No   Food not last or not have enough money for food? No   Do you have housing? (Housing is defined as stable permanent housing and does not include staying ouside in a car, in a tent, in an abandoned building, in an overnight shelter, or couch-surfing.) Yes   Are you worried about losing your housing? No   Lack of transportation? No   Unable to get utilities (heat,electricity)? No         2/3/2025   Dental   Dentist two times every year? (!) NO         2/3/2025   TB Screening   Were you born outside of the [place]? No         Today's PHQ-2 Score:       2/6/2025     9:01 AM   PHQ-2 ( 1999 [employer])   Q1: Little interest or pleasure in doing things 0   Q2: Feeling down, depressed or hopeless 0   PHQ-2 Score 0           2/3/2025   Substance Use   Alcohol more than 3/day or more than 7/wk No   Do you use any other substances recreationally? (!) CANNABIS PRODUCTS     Social History     Tobacco Use    Smoking status: Never    Smokeless tobacco: Never   Vaping Use    Vaping status: Never Used           2/3/2025   STI Screening   New sexual partner(s) since last STI/HIV test? (!) YES          2/3/2025   Contraception/Family Planning   Questions about contraception or family planning No        Reviewed and updated as needed this visit by Provider                             Objective    Exam  Pulse 88   Temp 98.4  F (36.9  C) (Oral)   Resp 16   Ht 1.727 m (5' 8\")   SpO2 98%   BMI 31.40 kg/m     Estimated body mass index is 31.4 kg/m  as calculated from " "the following:    Height as of this encounter: 1.727 m (5' 8\").    Weight as of 2/20/24: 93.7 kg (206 lb 8 oz).    Physical Exam  GENERAL: alert and no distress  HENT: ear canals and TM's normal, nose and mouth without ulcers or lesions  RESP: lungs clear to auscultation - no rales, rhonchi or wheezes  CV: regular rate and rhythm, normal S1 S2, no S3 or S4, no murmur, click or rub, no peripheral edema         Signed Electronically by: More Lorenzo MD    "

## 2025-02-07 LAB
C TRACH DNA SPEC QL PROBE+SIG AMP: NEGATIVE
C TRACH DNA SPEC QL PROBE+SIG AMP: NEGATIVE
N GONORRHOEA DNA SPEC QL NAA+PROBE: NEGATIVE
N GONORRHOEA DNA SPEC QL NAA+PROBE: NEGATIVE
SPECIMEN TYPE: NORMAL
SPECIMEN TYPE: NORMAL

## 2025-02-19 ENCOUNTER — MYC MEDICAL ADVICE (OUTPATIENT)
Dept: FAMILY MEDICINE | Facility: CLINIC | Age: 25
End: 2025-02-19
Payer: COMMERCIAL

## 2025-02-19 DIAGNOSIS — L64.9 ANDROGENIC ALOPECIA: Primary | ICD-10-CM

## 2025-02-26 RX ORDER — FINASTERIDE 5 MG/1
5 TABLET, FILM COATED ORAL DAILY
Qty: 30 TABLET | Refills: 3 | Status: SHIPPED | OUTPATIENT
Start: 2025-02-26

## 2025-03-03 DIAGNOSIS — L64.9 ANDROGENIC ALOPECIA: Primary | ICD-10-CM

## 2025-03-03 RX ORDER — FINASTERIDE 1 MG/1
1 TABLET, FILM COATED ORAL DAILY
Qty: 30 TABLET | Refills: 3 | Status: SHIPPED | OUTPATIENT
Start: 2025-03-03 | End: 2025-03-04

## 2025-04-17 ENCOUNTER — PRE VISIT (OUTPATIENT)
Dept: UROLOGY | Facility: CLINIC | Age: 25
End: 2025-04-17

## 2025-04-17 NOTE — TELEPHONE ENCOUNTER
Reason for visit: vasectomy consult    Records/imaging/labs/orders: in epic    Insurance:  Does patient carry state insurance?  No  Examples of state insurance:  Medicaid - this is straight Medical Assistance  [employer] - [place] product through [employer] [facility] - [place] product through [facility] PMAP  [employer] PMAP  [employer] [place]  [employer] PMAP  [facility] PMAP    If YES (Consent for Sterilization must be completed by patient and provider)    At Rooming: if in-person, please have pt complete consent for sterilization - consent is in Ken folder      Anthony Freddy  04/17/25  11:45 AM

## 2025-05-01 ENCOUNTER — OFFICE VISIT (OUTPATIENT)
Dept: UROLOGY | Facility: CLINIC | Age: 25
End: 2025-05-01
Payer: COMMERCIAL

## 2025-05-01 VITALS
OXYGEN SATURATION: 100 % | WEIGHT: 200 LBS | DIASTOLIC BLOOD PRESSURE: 88 MMHG | BODY MASS INDEX: 31.39 KG/M2 | SYSTOLIC BLOOD PRESSURE: 145 MMHG | HEART RATE: 102 BPM | HEIGHT: 67 IN

## 2025-05-01 DIAGNOSIS — Z30.09 ENCOUNTER FOR VASECTOMY COUNSELING: Primary | ICD-10-CM

## 2025-05-01 DIAGNOSIS — Z30.2 REQUEST FOR STERILIZATION: ICD-10-CM

## 2025-05-01 ASSESSMENT — PAIN SCALES - GENERAL: PAINLEVEL_OUTOF10: NO PAIN (0)

## 2025-05-01 NOTE — PROGRESS NOTES
CC: Desires sterilization, consult for vasectomy from Dr. More Lorenzo     HPI: Constantino Wheeler is a 24 year old male with no children, and he is intersted in getting a vasectomy for sterilization.      No history of  trauma or scrotal surgery.  No history of bleeding problems.       [place]/ [facility]? No      PMH:   TMJ  PREP treatment   3rd molars extracted     FAMILY HX:   Family History   Problem Relation Age of Onset    Thyroid Disease Mother     Hyperlipidemia Father     Diabetes Father     Hypertension Father     Diabetes Paternal Grandfather     Hypertension Paternal Grandfather     Colon Cancer No family hx of     Asthma No family hx of     Substance Abuse No family hx of       No history of coagulopathies     ALLERGIES:    No Known Allergies    MEDS:   Current Outpatient Medications   Medication Sig Dispense Refill    cyclobenzaprine (FLEXERIL) 5 MG tablet Take 1 tablet (5 mg) by mouth 3 times daily as needed for muscle spasms. 270 tablet 3    doxycycline hyclate (VIBRAMYCIN) 100 MG capsule Take 200mg (2 tablets) within the first 24 hours after having sex but can be taken within 3 days. No more than 1 dose in 24 hours. 30 capsule 3    emtricitabine-tenofovir (TRUVADA) 200-300 MG per tablet Take 1 tablet by mouth daily. 90 tablet 0    fluticasone furoate 27.5 MCG/SPRAY nasal spray       ibuprofen (ADVIL/MOTRIN) 200 MG capsule       dicyclomine (BENTYL) 10 MG capsule Take 1 capsule (10 mg) by mouth 4 times daily as needed (stomach cramping) (Patient not taking: Reported on 5/1/2025) 30 capsule 3    minoxidil (ROGAINE) 5 % external solution Apply directly to dry scalp in areas of thinning hair twice per day. Let the solution sit on your scalp and dry for at least 4 hours (Patient not taking: Reported on 5/1/2025) 120 mL 3     No current facility-administered medications for this visit.       SOCIAL HISTORY:  Social History     Tobacco Use    Smoking status: Never    Smokeless tobacco: Never   Vaping Use    Vaping  "status: Never Used   Substance Use Topics    Drug use: Yes     Types: Marijuana        GENERAL PHYSICAL EXAM:   BP (!) 145/88 (BP Location: Right arm, Patient Position: Sitting, Cuff Size: Adult Large)   Pulse 102   Ht 1.702 m (5' 7\")   Wt 90.7 kg (200 lb)   SpO2 100%   BMI 31.32 kg/m     Constitutional: No acute distress. Well nourished.   PSYCH: Normal mood and affect.   NEURO: Normal gait, no focal deficits.   EYES: Anicteric, EOMI, PERR  CARDIOPULMONARY: Breathing non-labored, pulse regular, no peripheral edema.   GI: Abdomen soft, nondistended   MUSCULOSKELETAL: Normal limb proportions, no muscle wasting, no contractures.    SKIN: Normal virilized hair distribution, no lesions, warts or rashes over genitalia, abdomen extremities or face.   HEME/LYMPH: No echymosis, no lymphadenopathy in groin, no lymphedema.     EXAM:  Phallus    Testes descended, consistency is normal. No intra-testicular masses.  Epididymes present.  Vas present bilateraly, Both are easy to find.  AYO deferred.        I discussed with him at length the risks and benefits of the procedure. He understands that this is a sterilization procedure, and not reversible contraception. He understands that reversals, while possible, are not guaranteed to work and fairly complex. I discussed with him the option of sperm cryopreservation.     I stressed that he continues to be fertile in the post-operative period, and that he should continue using other contraceptive methods, such as a condom, until he obtains a semen analysis and we review the results to confirm success of the procedure and infertility. I also stressed to him that recanalization and pregnancy can possibly occur (approx 1/2000 chance), even after we clear him with a semen analysis showing no motile sperm. I counseled him on the caryl-operative risks of bleeding, infection, pain.  I described to him post-vasectomy pain syndrome that can occur in about 1 to 2% of men undergoing " vasectomy. Usually this improves with time but in very rare cases can be persisting.    We also discussed recovery times (typically days if no complications) and post-operative care including use of ice packs, pain medication and wound care.    Plan:  - Schedule vasectomy procedure in clinic.       --------------------------------------------------------------------------------------------------------------------   Additional Coding Information:    Problems:  one acute uncomplicated illness or injury    Data Reviewed  Minimal or none    Level of risk:   low risk (e.g., OTC medication or observation, minor surgery without risks)    Time spent:  11 minutes spent on the date of the encounter doing chart review, history and exam, documentation and further activities as noted above.

## 2025-05-01 NOTE — LETTER
5/1/2025       RE: Constantino Wheeler  [address]  [place] [address]     Dear Colleague,    Thank you for referring your patient, Constantino Wheeler, to the [facility] [place] [facility]. Please see a copy of my visit note below.    CC: Desires sterilization, consult for vasectomy from Dr. More Lorenzo     HPI: Constantino Wheeler is a 24 year old male with no children, and he is intersted in getting a vasectomy for sterilization.      No history of  trauma or scrotal surgery.  No history of bleeding problems.       [facility]/ [facility]? No      PMH:   TMJ  PREP treatment   3rd molars extracted     FAMILY HX:   Family History   Problem Relation Age of Onset     Thyroid Disease Mother      Hyperlipidemia Father      Diabetes Father      Hypertension Father      Diabetes Paternal Grandfather      Hypertension Paternal Grandfather      Colon Cancer No family hx of      Asthma No family hx of      Substance Abuse No family hx of       No history of coagulopathies     ALLERGIES:    No Known Allergies    MEDS:   Current Outpatient Medications   Medication Sig Dispense Refill     cyclobenzaprine (FLEXERIL) 5 MG tablet Take 1 tablet (5 mg) by mouth 3 times daily as needed for muscle spasms. 270 tablet 3     doxycycline hyclate (VIBRAMYCIN) 100 MG capsule Take 200mg (2 tablets) within the first 24 hours after having sex but can be taken within 3 days. No more than 1 dose in 24 hours. 30 capsule 3     emtricitabine-tenofovir (TRUVADA) 200-300 MG per tablet Take 1 tablet by mouth daily. 90 tablet 0     fluticasone furoate 27.5 MCG/SPRAY nasal spray        ibuprofen (ADVIL/MOTRIN) 200 MG capsule        dicyclomine (BENTYL) 10 MG capsule Take 1 capsule (10 mg) by mouth 4 times daily as needed (stomach cramping) (Patient not taking: Reported on 5/1/2025) 30 capsule 3     minoxidil (ROGAINE) 5 % external solution Apply directly to dry scalp in areas of thinning hair twice  "per day. Let the solution sit on your scalp and dry for at least 4 hours (Patient not taking: Reported on 5/1/2025) 120 mL 3     No current facility-administered medications for this visit.       SOCIAL HISTORY:  Social History     Tobacco Use     Smoking status: Never     Smokeless tobacco: Never   Vaping Use     Vaping status: Never Used   Substance Use Topics     Drug use: Yes     Types: Marijuana        GENERAL PHYSICAL EXAM:   BP (!) 145/88 (BP Location: Right arm, Patient Position: Sitting, Cuff Size: Adult Large)   Pulse 102   Ht 1.702 m (5' 7\")   Wt 90.7 kg (200 lb)   SpO2 100%   BMI 31.32 kg/m     Constitutional: No acute distress. Well nourished.   PSYCH: Normal mood and affect.   NEURO: Normal gait, no focal deficits.   EYES: Anicteric, EOMI, PERR  CARDIOPULMONARY: Breathing non-labored, pulse regular, no peripheral edema.   GI: Abdomen soft, nondistended   MUSCULOSKELETAL: Normal limb proportions, no muscle wasting, no contractures.    SKIN: Normal virilized hair distribution, no lesions, warts or rashes over genitalia, abdomen extremities or face.   HEME/LYMPH: No echymosis, no lymphadenopathy in groin, no lymphedema.     EXAM:  Phallus    Testes descended, consistency is normal. No intra-testicular masses.  Epididymes present.  Vas present bilateraly, Both are easy to find.  AYO deferred.        I discussed with him at length the risks and benefits of the procedure. He understands that this is a sterilization procedure, and not reversible contraception. He understands that reversals, while possible, are not guaranteed to work and fairly complex. I discussed with him the option of sperm cryopreservation.     I stressed that he continues to be fertile in the post-operative period, and that he should continue using other contraceptive methods, such as a condom, until he obtains a semen analysis and we review the results to confirm success of the procedure and infertility. I also stressed to him " that recanalization and pregnancy can possibly occur (approx 1/2000 chance), even after we clear him with a semen analysis showing no motile sperm. I counseled him on the caryl-operative risks of bleeding, infection, pain.  I described to him post-vasectomy pain syndrome that can occur in about 1 to 2% of men undergoing vasectomy. Usually this improves with time but in very rare cases can be persisting.    We also discussed recovery times (typically days if no complications) and post-operative care including use of ice packs, pain medication and wound care.    Plan:  - Schedule vasectomy procedure in clinic.       --------------------------------------------------------------------------------------------------------------------   Additional Coding Information:    Problems:  one acute uncomplicated illness or injury    Data Reviewed  Minimal or none    Level of risk:   low risk (e.g., OTC medication or observation, minor surgery without risks)    Time spent:  11 minutes spent on the date of the encounter doing chart review, history and exam, documentation and further activities as noted above.       Again, thank you for allowing me to participate in the care of your patient.      Sincerely,    Ted Rogesr MD

## 2025-05-01 NOTE — NURSING NOTE
"Chief Complaint   Patient presents with    Consult     Pt states here for vasectomy consult       Blood pressure (!) 145/88, pulse 102, height 1.702 m (5' 7\"), weight 90.7 kg (200 lb), SpO2 100%. Body mass index is 31.32 kg/m .    Patient Active Problem List   Diagnosis    Candidate and currently on HIV prep    Autism    TMJ (temporomandibular joint syndrome)    Elevated blood pressure reading without diagnosis of hypertension       No Known Allergies    Current Outpatient Medications   Medication Sig Dispense Refill    cyclobenzaprine (FLEXERIL) 5 MG tablet Take 1 tablet (5 mg) by mouth 3 times daily as needed for muscle spasms. 270 tablet 3    doxycycline hyclate (VIBRAMYCIN) 100 MG capsule Take 200mg (2 tablets) within the first 24 hours after having sex but can be taken within 3 days. No more than 1 dose in 24 hours. 30 capsule 3    emtricitabine-tenofovir (TRUVADA) 200-300 MG per tablet Take 1 tablet by mouth daily. 90 tablet 0    fluticasone furoate 27.5 MCG/SPRAY nasal spray       ibuprofen (ADVIL/MOTRIN) 200 MG capsule       dicyclomine (BENTYL) 10 MG capsule Take 1 capsule (10 mg) by mouth 4 times daily as needed (stomach cramping) (Patient not taking: Reported on 5/1/2025) 30 capsule 3    minoxidil (ROGAINE) 5 % external solution Apply directly to dry scalp in areas of thinning hair twice per day. Let the solution sit on your scalp and dry for at least 4 hours (Patient not taking: Reported on 5/1/2025) 120 mL 3       Social History     Tobacco Use    Smoking status: Never    Smokeless tobacco: Never   Vaping Use    Vaping status: Never Used   Substance Use Topics    Drug use: Yes     Types: Marijuana       Anthony Hayes  5/1/2025  9:57 AM     "

## 2025-05-20 ENCOUNTER — PRE VISIT (OUTPATIENT)
Dept: UROLOGY | Facility: CLINIC | Age: 25
End: 2025-05-20
Payer: COMMERCIAL

## 2025-05-20 DIAGNOSIS — Z30.2 ENCOUNTER FOR VASECTOMY: Primary | ICD-10-CM

## 2025-05-20 NOTE — TELEPHONE ENCOUNTER
Patient coming in for vasectomy procedure    Sent [employer] message with prep which included:    Stop all blood thinners for seven days prior to procedure  Eat a meal prior to procedure  Shave the hair from the base of the penis and scrotum the night prior to procedure.      Anthony Hayes  05/20/25  8:08 AM

## 2025-06-12 ENCOUNTER — OFFICE VISIT (OUTPATIENT)
Dept: FAMILY MEDICINE | Facility: CLINIC | Age: 25
End: 2025-06-12
Payer: COMMERCIAL

## 2025-06-12 VITALS — TEMPERATURE: 98.1 F | HEIGHT: 67 IN | RESPIRATION RATE: 14 BRPM | BODY MASS INDEX: 31.32 KG/M2

## 2025-06-12 DIAGNOSIS — Z77.21 CONTACT WITH AND (SUSPECTED) EXPOSURE TO POTENTIALLY HAZARDOUS BODY FLUIDS: Primary | Chronic | ICD-10-CM

## 2025-06-12 DIAGNOSIS — R03.0 ELEVATED BLOOD PRESSURE READING WITHOUT DIAGNOSIS OF HYPERTENSION: ICD-10-CM

## 2025-06-12 DIAGNOSIS — Z11.3 SCREENING EXAMINATION FOR STI: ICD-10-CM

## 2025-06-12 LAB
HCV AB SERPL QL IA: NONREACTIVE
HIV 1+2 AB+HIV1 P24 AG SERPL QL IA: NONREACTIVE
T PALLIDUM AB SER QL: NONREACTIVE

## 2025-06-12 RX ORDER — EMTRICITABINE AND TENOFOVIR DISOPROXIL FUMARATE 200; 300 MG/1; MG/1
1 TABLET, FILM COATED ORAL DAILY
Qty: 90 TABLET | Refills: 0 | Status: SHIPPED | OUTPATIENT
Start: 2025-06-12
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
## 2025-06-17 ENCOUNTER — RESULTS FOLLOW-UP (OUTPATIENT)
Dept: FAMILY MEDICINE | Facility: CLINIC | Age: 25
End: 2025-06-17